# Patient Record
Sex: FEMALE | Race: OTHER | URBAN - METROPOLITAN AREA
[De-identification: names, ages, dates, MRNs, and addresses within clinical notes are randomized per-mention and may not be internally consistent; named-entity substitution may affect disease eponyms.]

---

## 2023-03-23 ENCOUNTER — TELEPHONE (OUTPATIENT)
Dept: OBGYN CLINIC | Facility: CLINIC | Age: 23
End: 2023-03-23

## 2023-03-23 ENCOUNTER — OFFICE VISIT (OUTPATIENT)
Dept: FAMILY MEDICINE CLINIC | Facility: CLINIC | Age: 23
End: 2023-03-23

## 2023-03-23 VITALS
DIASTOLIC BLOOD PRESSURE: 72 MMHG | HEART RATE: 75 BPM | HEIGHT: 61.4 IN | WEIGHT: 186 LBS | SYSTOLIC BLOOD PRESSURE: 106 MMHG | OXYGEN SATURATION: 98 % | BODY MASS INDEX: 34.67 KG/M2

## 2023-03-23 DIAGNOSIS — N91.2 AMENORRHEA: Primary | ICD-10-CM

## 2023-03-23 DIAGNOSIS — Z32.01 PREGNANCY TEST POSITIVE: ICD-10-CM

## 2023-03-23 LAB
CONTROL LINE PRESENT WITH A CLEAR BACKGROUND (YES/NO): YES YES/NO
PREGNANCY TEST, URINE: POSITIVE

## 2023-03-23 PROCEDURE — 81025 URINE PREGNANCY TEST: CPT | Performed by: FAMILY MEDICINE

## 2023-03-23 PROCEDURE — 3078F DIAST BP <80 MM HG: CPT | Performed by: FAMILY MEDICINE

## 2023-03-23 PROCEDURE — 3074F SYST BP LT 130 MM HG: CPT | Performed by: FAMILY MEDICINE

## 2023-03-23 PROCEDURE — 3008F BODY MASS INDEX DOCD: CPT | Performed by: FAMILY MEDICINE

## 2023-03-23 PROCEDURE — 99202 OFFICE O/P NEW SF 15 MIN: CPT | Performed by: FAMILY MEDICINE

## 2023-03-23 NOTE — TELEPHONE ENCOUNTER
Pts LMP of 2/3/2023 making her 6w6d. Pt states regular cycles of every 20-31 days. States +UPT. Pt informed of both male and female providers and the need to rotate PN appt with all since OB on-call will be the one that delivers her. Pt accepts rotation and wishes to proceed with establishing care. Pt instructed to start OTC PNV with DHA, FOLIC ACID AND IRON. Pt scheduled for appt with EMB on 3/30 d/t period cycles. Pt informed once gestation is established we would do OBN at approx 8-9 wks. Pt states understanding.      Stated HT: 5 ft 1 in  Stated Pre-pregnancy WT: 186 lb

## 2023-03-30 ENCOUNTER — OFFICE VISIT (OUTPATIENT)
Dept: OBGYN CLINIC | Facility: CLINIC | Age: 23
End: 2023-03-30

## 2023-03-30 VITALS
WEIGHT: 183.63 LBS | HEART RATE: 76 BPM | DIASTOLIC BLOOD PRESSURE: 79 MMHG | BODY MASS INDEX: 34 KG/M2 | SYSTOLIC BLOOD PRESSURE: 117 MMHG

## 2023-03-30 DIAGNOSIS — N92.6 MISSED MENSES: Primary | ICD-10-CM

## 2023-03-30 DIAGNOSIS — Z32.01 POSITIVE PREGNANCY TEST: ICD-10-CM

## 2023-03-30 PROCEDURE — 3074F SYST BP LT 130 MM HG: CPT | Performed by: NURSE PRACTITIONER

## 2023-03-30 PROCEDURE — 99203 OFFICE O/P NEW LOW 30 MIN: CPT | Performed by: NURSE PRACTITIONER

## 2023-03-30 PROCEDURE — 3078F DIAST BP <80 MM HG: CPT | Performed by: NURSE PRACTITIONER

## 2023-04-01 ENCOUNTER — NURSE ONLY (OUTPATIENT)
Dept: OBGYN CLINIC | Facility: CLINIC | Age: 23
End: 2023-04-01

## 2023-04-01 ENCOUNTER — TELEPHONE (OUTPATIENT)
Dept: OBGYN CLINIC | Facility: CLINIC | Age: 23
End: 2023-04-01

## 2023-04-12 ENCOUNTER — HOSPITAL ENCOUNTER (OUTPATIENT)
Dept: ULTRASOUND IMAGING | Age: 23
Discharge: HOME | End: 2023-04-12
Attending: NURSE PRACTITIONER
Payer: MEDICAID

## 2023-04-12 DIAGNOSIS — N92.6 MISSED MENSES: ICD-10-CM

## 2023-04-12 DIAGNOSIS — Z32.01 POSITIVE PREGNANCY TEST: ICD-10-CM

## 2023-04-12 PROCEDURE — 93975 VASCULAR STUDY: CPT | Performed by: NURSE PRACTITIONER

## 2023-04-12 PROCEDURE — 76817 TRANSVAGINAL US OBSTETRIC: CPT | Performed by: NURSE PRACTITIONER

## 2023-04-17 ENCOUNTER — NURSE ONLY (OUTPATIENT)
Dept: OBGYN CLINIC | Facility: CLINIC | Age: 23
End: 2023-04-17

## 2023-04-17 ENCOUNTER — TELEPHONE (OUTPATIENT)
Dept: OBGYN CLINIC | Facility: CLINIC | Age: 23
End: 2023-04-17

## 2023-04-17 DIAGNOSIS — Z36.9 FIRST TRIMESTER SCREENING: Primary | ICD-10-CM

## 2023-04-17 DIAGNOSIS — Z34.01 ENCOUNTER FOR SUPERVISION OF NORMAL FIRST PREGNANCY IN FIRST TRIMESTER: Primary | ICD-10-CM

## 2023-04-17 NOTE — PROGRESS NOTES
Pt seen for OBN appt today with no complaints dating 11w0d by US. Normal PN labs ordered plus 1 hr gtt d/t BMI. Message sent to referrals for FTS. Pt advised all labs must be completed and resulted prior to MD appt. She is scheduled 4/24/23 with EMB. HT 5'1\"   lb  BMI 34    Partner's name is Claudia Tillman contact #[phone]; race:   Occupation: Customer Care Rep and Dispatcher for [employer] No    Anesthetic complications No    Anxiety/Depression  No    Autoimmune Disorder No    Asthma  No    Cancer No    Diabetes  No    Gyne/breast Surgery No    Heart Disease No    Hepatitis/Liver Disease  No    History of blood transfusion No    History of abnormal pap No    Hypertension  No    Infertility  No    Kidney Disease/Frequent UTIs  No    Medication Allergies No    Latex Allergies No    Food Allergies  No    Neurological Disorder/Epilepsy No    Operations/Hospitalizations No    TB exposure  No    Thyroid Dysfunction No    Trauma/Violence  No    Uterine Anomaly  No    Uterine Fibroids  No    Variocosities/DVTs No    Smoker No    Drug usage in prior year No    Alcohol No    Would you accept a blood transfusion? Yes            INFECTION HISTORY    Chlamydia No    Pt or partner have hx of Genital Herpes No    Gonorrhea No    Hepatitis B No    HIV No    HPV No    MRSA No    Syphilis No    Tattoos Yes    Live with someone or Exposed to TB No    Rash or viral illness since LMP  No    Varicella No vaccinated   Recent Travel (or planned travel) to [facility] area for self and or partner No    Pets Yes 1 cat and 1 dog, reviewed toxoplasmosis     GENETICS SCREENING    Genetic Screening    Genetic Screening/Teratology Counseling- Includes patient, baby's father, or anyone in either family with:  Patient's age 28 years or older as of estimated date of delivery: No   Thalassemia ([place], [place], [address], or  background):  MCV less than 80: No   Neural tube defect (Meningomyelocele, Spina bifida, or Anencephaly): No   Congenital heart defect: Yes (Comment: SELENA's sister with hole in heart, living)   Down syndrome: No   Sumit-Sachs ([place], [place], [place]): No   Canavan disease ([place]): No   Familial dysautonomia ([place]): No   Sickle cell disease or trait (): No   Hemophilia or other blood disorders: No   Muscular dystrophy: No   Cystic fibrosis: No   Whiteside's chorea: No   Intellectual disability and/or autism: Yes (Comment: SELENA's younger siblings, 1 with severe autism)   If yes, was the person tested for Fragile X?: No   Other inherited genetic or chromosomal disorder: No   Maternal metabolic disorder (eg. Type 1 diabetes, PKU): No   Patient or baby's father had child with birth defects not listed above: No   Recurrent pregnancy loss, or a stillbirth: Yes (Comment: Pt's mom with multiple miscarriages)   Medications (including supplements, vitamins, herbs, or OTC drugs)/illicit/recreational drugs/alcohol since last menstrual period: No               MISC    Infant vaccinations  Yes    Pt. Has answered NO 5P questions and has NO  risk factors. Pt. Given What pregnant women need to know handout.

## 2023-04-19 ENCOUNTER — LAB ENCOUNTER (OUTPATIENT)
Dept: LAB | Facility: REFERENCE LAB | Age: 23
End: 2023-04-19
Attending: OBSTETRICS & GYNECOLOGY
Payer: MEDICAID

## 2023-04-19 DIAGNOSIS — Z34.01 ENCOUNTER FOR SUPERVISION OF NORMAL FIRST PREGNANCY IN FIRST TRIMESTER: ICD-10-CM

## 2023-04-19 LAB
ANTIBODY SCREEN: NEGATIVE
BASOPHILS # BLD AUTO: 0.03 X10(3) UL (ref 0–0.2)
BASOPHILS NFR BLD AUTO: 0.5 %
DEPRECATED RDW RBC AUTO: 40.7 FL (ref 35.1–46.3)
EOSINOPHIL # BLD AUTO: 0.09 X10(3) UL (ref 0–0.7)
EOSINOPHIL NFR BLD AUTO: 1.4 %
ERYTHROCYTE [DISTWIDTH] IN BLOOD BY AUTOMATED COUNT: 13.3 % (ref 11–15)
GLUCOSE 1H P GLC SERPL-MCNC: 96 MG/DL
HBV SURFACE AG SER-ACNC: <0.1 [IU]/L
HBV SURFACE AG SERPL QL IA: NONREACTIVE
HCT VFR BLD AUTO: 40.8 %
HCV AB SERPL QL IA: NONREACTIVE
HGB BLD-MCNC: 13.1 G/DL
IMM GRANULOCYTES # BLD AUTO: 0.01 X10(3) UL (ref 0–1)
IMM GRANULOCYTES NFR BLD: 0.2 %
LYMPHOCYTES # BLD AUTO: 1.76 X10(3) UL (ref 1–4)
LYMPHOCYTES NFR BLD AUTO: 27.5 %
MCH RBC QN AUTO: 27 PG (ref 26–34)
MCHC RBC AUTO-ENTMCNC: 32.1 G/DL (ref 31–37)
MCV RBC AUTO: 84.1 FL
MONOCYTES # BLD AUTO: 0.42 X10(3) UL (ref 0.1–1)
MONOCYTES NFR BLD AUTO: 6.6 %
NEUTROPHILS # BLD AUTO: 4.08 X10 (3) UL (ref 1.5–7.7)
NEUTROPHILS # BLD AUTO: 4.08 X10(3) UL (ref 1.5–7.7)
NEUTROPHILS NFR BLD AUTO: 63.8 %
PLATELET # BLD AUTO: 361 10(3)UL (ref 150–450)
RBC # BLD AUTO: 4.85 X10(6)UL
RH BLOOD TYPE: POSITIVE
RUBV IGG SER QL: POSITIVE
RUBV IGG SER-ACNC: 157.3 IU/ML (ref 10–?)
WBC # BLD AUTO: 6.4 X10(3) UL (ref 4–11)

## 2023-04-19 PROCEDURE — 86780 TREPONEMA PALLIDUM: CPT

## 2023-04-19 PROCEDURE — 87389 HIV-1 AG W/HIV-1&-2 AB AG IA: CPT

## 2023-04-19 PROCEDURE — 86803 HEPATITIS C AB TEST: CPT

## 2023-04-19 PROCEDURE — 86850 RBC ANTIBODY SCREEN: CPT

## 2023-04-19 PROCEDURE — 82950 GLUCOSE TEST: CPT

## 2023-04-19 PROCEDURE — 87086 URINE CULTURE/COLONY COUNT: CPT

## 2023-04-19 PROCEDURE — 86787 VARICELLA-ZOSTER ANTIBODY: CPT

## 2023-04-19 PROCEDURE — 87340 HEPATITIS B SURFACE AG IA: CPT

## 2023-04-19 PROCEDURE — 86900 BLOOD TYPING SEROLOGIC ABO: CPT

## 2023-04-19 PROCEDURE — 86762 RUBELLA ANTIBODY: CPT

## 2023-04-19 PROCEDURE — 86901 BLOOD TYPING SEROLOGIC RH(D): CPT

## 2023-04-19 PROCEDURE — 85025 COMPLETE CBC W/AUTO DIFF WBC: CPT

## 2023-04-19 PROCEDURE — 36415 COLL VENOUS BLD VENIPUNCTURE: CPT

## 2023-04-21 LAB
T PALLIDUM AB SER QL: NEGATIVE
VZV IGG SER IA-ACNC: 158.4 (ref 165–?)

## 2023-04-24 ENCOUNTER — INITIAL PRENATAL (OUTPATIENT)
Dept: OBGYN CLINIC | Facility: CLINIC | Age: 23
End: 2023-04-24

## 2023-04-24 ENCOUNTER — TELEPHONE (OUTPATIENT)
Dept: OBGYN CLINIC | Facility: CLINIC | Age: 23
End: 2023-04-24

## 2023-04-24 VITALS
HEART RATE: 71 BPM | DIASTOLIC BLOOD PRESSURE: 72 MMHG | BODY MASS INDEX: 34.1 KG/M2 | SYSTOLIC BLOOD PRESSURE: 109 MMHG | HEIGHT: 61 IN | WEIGHT: 180.63 LBS

## 2023-04-24 DIAGNOSIS — Z12.4 SCREENING FOR CERVICAL CANCER: ICD-10-CM

## 2023-04-24 DIAGNOSIS — O99.210 OBESITY AFFECTING PREGNANCY, ANTEPARTUM: ICD-10-CM

## 2023-04-24 DIAGNOSIS — Z28.39 MATERNAL VARICELLA, NON-IMMUNE: ICD-10-CM

## 2023-04-24 DIAGNOSIS — Z82.79 FAMILY HISTORY OF CONGENITAL HEART DISEASE: ICD-10-CM

## 2023-04-24 DIAGNOSIS — O09.899 MATERNAL VARICELLA, NON-IMMUNE: ICD-10-CM

## 2023-04-24 DIAGNOSIS — Z3A.11 11 WEEKS GESTATION OF PREGNANCY: ICD-10-CM

## 2023-04-24 DIAGNOSIS — Z34.91 ENCOUNTER FOR SUPERVISION OF NORMAL PREGNANCY IN FIRST TRIMESTER, UNSPECIFIED GRAVIDITY: Primary | ICD-10-CM

## 2023-04-24 DIAGNOSIS — Z82.79 FAMILY HISTORY OF CONGENITAL HEART DEFECT: Primary | ICD-10-CM

## 2023-04-24 PROBLEM — Z13.79 GENETIC TESTING: Status: ACTIVE | Noted: 2023-04-24

## 2023-04-24 LAB
APPEARANCE: CLEAR
BILIRUBIN: NEGATIVE
GLUCOSE (URINE DIPSTICK): NEGATIVE MG/DL
KETONES (URINE DIPSTICK): NEGATIVE MG/DL
MULTISTIX LOT#: ABNORMAL NUMERIC
NITRITE, URINE: NEGATIVE
OCCULT BLOOD: NEGATIVE
PH, URINE: 7 (ref 4.5–8)
PROTEIN (URINE DIPSTICK): 30 MG/DL
SPECIFIC GRAVITY: 1.01 (ref 1–1.03)
URINE-COLOR: YELLOW
UROBILINOGEN,SEMI-QN: 0.2 MG/DL (ref 0–1.9)

## 2023-04-24 PROCEDURE — 0500F INITIAL PRENATAL CARE VISIT: CPT | Performed by: NURSE PRACTITIONER

## 2023-04-24 PROCEDURE — 81002 URINALYSIS NONAUTO W/O SCOPE: CPT | Performed by: NURSE PRACTITIONER

## 2023-04-24 PROCEDURE — 3074F SYST BP LT 130 MM HG: CPT | Performed by: NURSE PRACTITIONER

## 2023-04-24 PROCEDURE — 3008F BODY MASS INDEX DOCD: CPT | Performed by: NURSE PRACTITIONER

## 2023-04-24 PROCEDURE — 3078F DIAST BP <80 MM HG: CPT | Performed by: NURSE PRACTITIONER

## 2023-04-24 NOTE — PROGRESS NOTES
New OB. Sure LMP. No nausea or vomiting, no pelvic pain or bleeding. She has sure LMP, US on 4/12 showed SLIUP 10w2d, c/w EDC by LMP. Final EDC 11/10/2023. rev'd new OB labs  Varicella nonimmune  Pap with gc/ct trich done today  Desires gentic testing  Will send to [facility] for level 2 and fetal echo also due to family hx of CHD on FOBs side of family (his sister).   rev'd sab precautions    NORIS Marquez

## 2023-04-24 NOTE — TELEPHONE ENCOUNTER
----- Message from NORIS Felipe sent at 4/24/2023  8:56 AM CDT -----  Regarding: desires genetic testing with MFM, family hx of CHD  Patient desires genetic testing with MFM  She will also need level 2 US and fetal echo due to family hx of congenital heart defect.     NORIS Felipe

## 2023-04-24 NOTE — TELEPHONE ENCOUNTER
FTS order was already placed on 4/17, and pt read on 4/19. Level 2, fetal echo, growth, and NST due to family history of heart defect and high BMI    [facility] sent reminder message sent to schedule FTS and other orders placed.

## 2023-04-25 ENCOUNTER — TELEPHONE (OUTPATIENT)
Dept: OBGYN CLINIC | Facility: CLINIC | Age: 23
End: 2023-04-25

## 2023-04-25 ENCOUNTER — TELEPHONE (OUTPATIENT)
Dept: PERINATAL CARE | Facility: HOSPITAL | Age: 23
End: 2023-04-25

## 2023-04-25 LAB
C TRACH DNA SPEC QL NAA+PROBE: NEGATIVE
N GONORRHOEA DNA SPEC QL NAA+PROBE: NEGATIVE

## 2023-04-25 NOTE — TELEPHONE ENCOUNTER
Spoke to pt. Aware orders have been placed. And provided with MFM number to schedule appt.  Pt verbalized understandings

## 2023-04-25 NOTE — TELEPHONE ENCOUNTER
----- Message from Bueeno sent at 4/25/2023 10:36 AM CDT -----  Regarding: [facility] AUTHORIZATION  Patient is scheduled on 5-4-23  For  CONSULT/FT    [id number]  DX   Family history of congenital heart defect  BMI 34.0-34.9,adult  Needs PA

## 2023-04-26 LAB — T VAGINALIS RRNA SPEC QL NAA+PROBE: NEGATIVE

## 2023-05-04 ENCOUNTER — TELEPHONE (OUTPATIENT)
Dept: PERINATAL CARE | Facility: HOSPITAL | Age: 23
End: 2023-05-04

## 2023-05-04 ENCOUNTER — HOSPITAL ENCOUNTER (OUTPATIENT)
Dept: PERINATAL CARE | Facility: HOSPITAL | Age: 23
Discharge: HOME | End: 2023-05-04
Attending: OBSTETRICS & GYNECOLOGY
Payer: MEDICAID

## 2023-05-04 ENCOUNTER — HOSPITAL ENCOUNTER (OUTPATIENT)
Dept: PERINATAL CARE | Facility: HOSPITAL | Age: 23
Discharge: HOME | End: 2023-05-04
Attending: NURSE PRACTITIONER
Payer: MEDICAID

## 2023-05-04 VITALS
BODY MASS INDEX: 34 KG/M2 | WEIGHT: 180 LBS | SYSTOLIC BLOOD PRESSURE: 114 MMHG | DIASTOLIC BLOOD PRESSURE: 74 MMHG | HEART RATE: 87 BPM

## 2023-05-04 DIAGNOSIS — Z82.79 FAMILY HISTORY OF CONGENITAL HEART DISEASE: ICD-10-CM

## 2023-05-04 DIAGNOSIS — O99.210 OBESITY AFFECTING PREGNANCY, ANTEPARTUM: ICD-10-CM

## 2023-05-04 DIAGNOSIS — Z13.79 GENETIC TESTING: ICD-10-CM

## 2023-05-04 DIAGNOSIS — Z36.9 FIRST TRIMESTER SCREENING: ICD-10-CM

## 2023-05-04 DIAGNOSIS — Z36.9 FIRST TRIMESTER SCREENING: Primary | ICD-10-CM

## 2023-05-04 PROBLEM — E66.9 OBESITY (BMI 30.0-34.9): Status: ACTIVE | Noted: 2023-05-04

## 2023-05-04 PROBLEM — E66.811 OBESITY (BMI 30.0-34.9): Status: ACTIVE | Noted: 2023-05-04

## 2023-05-04 PROCEDURE — 36415 COLL VENOUS BLD VENIPUNCTURE: CPT

## 2023-05-04 PROCEDURE — 76801 OB US < 14 WKS SINGLE FETUS: CPT | Performed by: OBSTETRICS & GYNECOLOGY

## 2023-05-11 ENCOUNTER — TELEPHONE (OUTPATIENT)
Dept: PERINATAL CARE | Facility: HOSPITAL | Age: 23
End: 2023-05-11

## 2023-05-11 NOTE — TELEPHONE ENCOUNTER
Panorama screening results obt  Reviewed by DR Melba Adler    Results:  low risk  Low Risk for Aneuploidies, triploidy and Monosomy X  Fetal Sex: Male  Fetal Fraction: 8.9%      Pt states understanding  Copy of results sent for scanning into pt record

## 2023-05-18 ENCOUNTER — TELEPHONE (OUTPATIENT)
Dept: OBGYN CLINIC | Facility: CLINIC | Age: 23
End: 2023-05-18

## 2023-05-18 LAB — HPV I/H RISK 1 DNA SPEC QL NAA+PROBE: POSITIVE

## 2023-05-18 NOTE — TELEPHONE ENCOUNTER
----- Message from Charlie Church sent at 5/4/2023  9:24 AM CDT -----  Regarding: [facility] AUTHORIZATION  Patient is scheduled on 6-26-23  For  LEVEL 2   [id number]  DX   Family history of congenital heart defect  BMI>34  Needs PA

## 2023-05-18 NOTE — TELEPHONE ENCOUNTER
Your case has been Approved.      Provider Name: DR. Maggy Sahu: nona  Provider Address: [address] [facility] Phone Number: [phone]   Fax Number: [phone]  Patient Name: Dragan Monsalve Patient Id: [id number]  Insurance Carrier: [facility]  Site Name: [address] [place] [address] Site ID: [id number]  Site Address: [address] [place] [address]      Primary Diagnosis Code: O99.210 Description: Obesity complicating pregnancy, unspecified trimester  Secondary Diagnosis Code:  Description:   Date of Service: Not provided    CPT Code: 74940 Description: OB US, DETAILED, [address] [facility] Number: [id number]  Review Date: 5/18/2023 4:55:29 PM  Expiration Date: 9/15/2023  Status: Your case has been Approved

## 2023-05-22 ENCOUNTER — ROUTINE PRENATAL (OUTPATIENT)
Dept: OBGYN CLINIC | Facility: CLINIC | Age: 23
End: 2023-05-22

## 2023-05-22 VITALS
SYSTOLIC BLOOD PRESSURE: 114 MMHG | DIASTOLIC BLOOD PRESSURE: 75 MMHG | WEIGHT: 178 LBS | HEART RATE: 72 BPM | BODY MASS INDEX: 34 KG/M2

## 2023-05-22 DIAGNOSIS — Z34.92 ENCOUNTER FOR SUPERVISION OF NORMAL PREGNANCY IN SECOND TRIMESTER, UNSPECIFIED GRAVIDITY: Primary | ICD-10-CM

## 2023-05-22 PROBLEM — R87.610 ASCUS WITH POSITIVE HIGH RISK HPV CERVICAL: Status: ACTIVE | Noted: 2023-05-22

## 2023-05-22 PROBLEM — R87.810 ASCUS WITH POSITIVE HIGH RISK HPV CERVICAL: Status: ACTIVE | Noted: 2023-05-22

## 2023-05-22 LAB
APPEARANCE: CLEAR
BILIRUBIN: NEGATIVE
GLUCOSE (URINE DIPSTICK): NEGATIVE MG/DL
KETONES (URINE DIPSTICK): NEGATIVE MG/DL
MULTISTIX LOT#: ABNORMAL NUMERIC
NITRITE, URINE: NEGATIVE
OCCULT BLOOD: NEGATIVE
PH, URINE: 7.5 (ref 4.5–8)
PROTEIN (URINE DIPSTICK): NEGATIVE MG/DL
SPECIFIC GRAVITY: 1.01 (ref 1–1.03)
URINE-COLOR: YELLOW
UROBILINOGEN,SEMI-QN: 0.2 MG/DL (ref 0–1.9)

## 2023-05-23 ENCOUNTER — TELEPHONE (OUTPATIENT)
Dept: OBGYN CLINIC | Facility: CLINIC | Age: 23
End: 2023-05-23

## 2023-05-23 NOTE — TELEPHONE ENCOUNTER
Lmtcb.    ----- Message from NORIS Santos sent at 5/18/2023  1:59 PM CDT -----  Pap (age 26) ASCUS, HPV +  Per asccp guidelines, repeat in 1 year    NORIS Santos

## 2023-06-01 ENCOUNTER — PATIENT MESSAGE (OUTPATIENT)
Dept: OBGYN CLINIC | Facility: CLINIC | Age: 23
End: 2023-06-01

## 2023-06-01 ENCOUNTER — TELEPHONE (OUTPATIENT)
Dept: OBGYN CLINIC | Facility: CLINIC | Age: 23
End: 2023-06-01

## 2023-06-01 NOTE — TELEPHONE ENCOUNTER
16w6d pt calling with VIRK since Monday and episode of dizziness this morning at 3am. 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 She confirmed she had not yet eaten at that time. She had family party last night, went to bed at 9pm, so only 5 hrs sleep. She reports consistently achieving 32 oz hydration per day. She does eat a snack every 3 hours or so during the day. For HA she took tylenol 500 mg x 1 dose, but it did not help. Pt directed to increase hydration to 64 oz min. If working early in the AM, needs to eat when she wakes up. She reports she often feels full very easily, so not always hungry. Directed to eat small frequent meals as she has been doing, but needs to wake up if she is out working. Increase sleep. She will nap today and let us know if no improvement in symptoms. Can take tylenol 1000 mg q8hrs prn. Patient verbalized understanding. To SENTHIL for sign off.

## 2023-06-01 NOTE — TELEPHONE ENCOUNTER
Pt called to discuss test results from couple of weeks ago. [employer] message sent on 6/1. Please call.

## 2023-06-21 ENCOUNTER — ROUTINE PRENATAL (OUTPATIENT)
Dept: OBGYN CLINIC | Facility: CLINIC | Age: 23
End: 2023-06-21

## 2023-06-21 VITALS
HEART RATE: 86 BPM | WEIGHT: 176 LBS | SYSTOLIC BLOOD PRESSURE: 102 MMHG | BODY MASS INDEX: 33 KG/M2 | DIASTOLIC BLOOD PRESSURE: 68 MMHG

## 2023-06-21 DIAGNOSIS — Z34.02 ENCOUNTER FOR SUPERVISION OF NORMAL FIRST PREGNANCY IN SECOND TRIMESTER: Primary | ICD-10-CM

## 2023-06-21 LAB
APPEARANCE: CLEAR
GLUCOSE (URINE DIPSTICK): NEGATIVE MG/DL
MULTISTIX LOT#: NORMAL NUMERIC
NITRITE, URINE: NEGATIVE
URINE-COLOR: YELLOW

## 2023-06-21 PROCEDURE — 81002 URINALYSIS NONAUTO W/O SCOPE: CPT | Performed by: OBSTETRICS & GYNECOLOGY

## 2023-06-21 PROCEDURE — 0502F SUBSEQUENT PRENATAL CARE: CPT | Performed by: OBSTETRICS & GYNECOLOGY

## 2023-06-21 PROCEDURE — 3078F DIAST BP <80 MM HG: CPT | Performed by: OBSTETRICS & GYNECOLOGY

## 2023-06-21 PROCEDURE — 3074F SYST BP LT 130 MM HG: CPT | Performed by: OBSTETRICS & GYNECOLOGY
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
## 2023-06-26 ENCOUNTER — APPOINTMENT (OUTPATIENT)
Dept: PERINATAL CARE | Facility: HOSPITAL | Age: 23
End: 2023-06-26
Attending: OBSTETRICS & GYNECOLOGY
Payer: MEDICAID

## 2023-06-26 ENCOUNTER — HOSPITAL ENCOUNTER (OUTPATIENT)
Dept: PERINATAL CARE | Facility: HOSPITAL | Age: 23
Discharge: HOME | End: 2023-06-26
Attending: OBSTETRICS & GYNECOLOGY
Payer: MEDICAID

## 2023-06-26 ENCOUNTER — TELEPHONE (OUTPATIENT)
Dept: PERINATAL CARE | Facility: HOSPITAL | Age: 23
End: 2023-06-26

## 2023-06-26 DIAGNOSIS — O99.210 OBESITY AFFECTING PREGNANCY, ANTEPARTUM: ICD-10-CM

## 2023-06-26 DIAGNOSIS — Z82.79 FAMILY HISTORY OF CONGENITAL HEART DISEASE: Primary | ICD-10-CM

## 2023-06-26 DIAGNOSIS — E66.9 OBESITY (BMI 30.0-34.9): ICD-10-CM

## 2023-06-26 DIAGNOSIS — Z82.79 FAMILY HISTORY OF CONGENITAL HEART DISEASE: ICD-10-CM

## 2023-06-26 NOTE — TELEPHONE ENCOUNTER
CALLED PATIENT AT 8:08 AM. NO ANSWER. LEFT MESSAGE SHE HAS AN 8:00 AM APPOINTMENT TODAY, MONDAY, 6/26/23. WE HAVE A 15 MINUTE LATE POLICY. PLEASE CALL THE OFFICE [phone] AND LET US KNOW IF YOU ARE ON YOUR WAY.

## 2023-07-03 ENCOUNTER — APPOINTMENT (OUTPATIENT)
Dept: ULTRASOUND IMAGING | Facility: HOSPITAL | Age: 23
End: 2023-07-03
Attending: OBSTETRICS & GYNECOLOGY
Payer: MEDICAID

## 2023-07-03 ENCOUNTER — TELEPHONE (OUTPATIENT)
Dept: PERINATAL CARE | Facility: HOSPITAL | Age: 23
End: 2023-07-03

## 2023-07-03 ENCOUNTER — HOSPITAL ENCOUNTER (OUTPATIENT)
Facility: HOSPITAL | Age: 23
Discharge: HOME | End: 2023-07-03
Attending: OBSTETRICS & GYNECOLOGY | Admitting: OBSTETRICS & GYNECOLOGY
Payer: MEDICAID

## 2023-07-03 ENCOUNTER — HOSPITAL ENCOUNTER (EMERGENCY)
Facility: HOSPITAL | Age: 23
Discharge: STILL ADMITTED OR ADMINISTRATIVE | End: 2023-07-03
Payer: MEDICAID

## 2023-07-03 VITALS — DIASTOLIC BLOOD PRESSURE: 65 MMHG | HEART RATE: 99 BPM | SYSTOLIC BLOOD PRESSURE: 108 MMHG

## 2023-07-03 LAB
BASOPHILS # BLD AUTO: 0.02 X10(3) UL (ref 0–0.2)
BASOPHILS NFR BLD AUTO: 0.2 %
DEPRECATED RDW RBC AUTO: 39.8 FL (ref 35.1–46.3)
EOSINOPHIL # BLD AUTO: 0.09 X10(3) UL (ref 0–0.7)
EOSINOPHIL NFR BLD AUTO: 1 %
ERYTHROCYTE [DISTWIDTH] IN BLOOD BY AUTOMATED COUNT: 13.6 % (ref 11–15)
FIBRINOGEN PPP-MCNC: 421 MG/DL (ref 180–480)
HCT VFR BLD AUTO: 32.2 %
HGB BLD-MCNC: 10.4 G/DL
HGB F MFR BLD KLEIH BETKE: <0.1 %
IMM GRANULOCYTES # BLD AUTO: 0.03 X10(3) UL (ref 0–1)
IMM GRANULOCYTES NFR BLD: 0.3 %
LYMPHOCYTES # BLD AUTO: 2.04 X10(3) UL (ref 1–4)
LYMPHOCYTES NFR BLD AUTO: 23.4 %
MCH RBC QN AUTO: 26.2 PG (ref 26–34)
MCHC RBC AUTO-ENTMCNC: 32.3 G/DL (ref 31–37)
MCV RBC AUTO: 81.1 FL
MONOCYTES # BLD AUTO: 0.47 X10(3) UL (ref 0.1–1)
MONOCYTES NFR BLD AUTO: 5.4 %
NEUTROPHILS # BLD AUTO: 6.05 X10 (3) UL (ref 1.5–7.7)
NEUTROPHILS # BLD AUTO: 6.05 X10(3) UL (ref 1.5–7.7)
NEUTROPHILS NFR BLD AUTO: 69.7 %
PLATELET # BLD AUTO: 380 10(3)UL (ref 150–450)
RBC # BLD AUTO: 3.97 X10(6)UL
WBC # BLD AUTO: 8.7 X10(3) UL (ref 4–11)

## 2023-07-03 PROCEDURE — 85025 COMPLETE CBC W/AUTO DIFF WBC: CPT | Performed by: OBSTETRICS & GYNECOLOGY

## 2023-07-03 PROCEDURE — 85384 FIBRINOGEN ACTIVITY: CPT | Performed by: OBSTETRICS & GYNECOLOGY

## 2023-07-03 PROCEDURE — 85460 HEMOGLOBIN FETAL: CPT | Performed by: OBSTETRICS & GYNECOLOGY

## 2023-07-03 PROCEDURE — 76815 OB US LIMITED FETUS(S): CPT | Performed by: OBSTETRICS & GYNECOLOGY

## 2023-07-03 PROCEDURE — 85362 FIBRIN DEGRADATION PRODUCTS: CPT | Performed by: OBSTETRICS & GYNECOLOGY

## 2023-07-03 PROCEDURE — 99214 OFFICE O/P EST MOD 30 MIN: CPT

## 2023-07-03 PROCEDURE — 36415 COLL VENOUS BLD VENIPUNCTURE: CPT

## 2023-07-03 NOTE — PROGRESS NOTES
Pt is a 26year old female admitted to [facility]. Patient presents with:  Vaginal Bleeding: Pt states it was \"a lot\" of bright red bleeding     Pt is  21w3d intra-uterine pregnancy. History obtained, consents signed. Oriented to room, staff, and plan of care.

## 2023-07-03 NOTE — TRIAGE
[facility]      Triage Note    Luis Alfredo Kelley Patient Status:  Outpatient    2000 MRN [id number]   Location [address], [address] [facility] PCP No primary care provider on file.  Para:   Estimated Date of Delivery: 11/10/23  Gestation: 21w3d    Chief Complaint    Vaginal Bleeding         Allergies:  No Known Allergies    Orders Placed This Encounter      CBC With Differential With Platelet      FDP, PLASMA      Fibrinogen Activity      Kleihauer-Betke stain      Kleihauer-Betke Fetal Hemoglobin      Kleihauer-Betke, Path Review      Lab Results   Component Value Date    WBC 8.7 2023    HGB 10.4 (L) 2023    HCT 32.2 (L) 2023    .0 2023       Clinitek UA  Lab Results   Component Value Date    SPECGRAVITY 1.015 2023    URINECUL  2023     <10,000 cfu/ml Mixture of Gram positive organisms isolated - probable contamination. UA  Lab Results   Component Value Date    SPECGRAVITY 1.015 2023    NITRITE Negative 2023  0100   BP: 108/65   Pulse: 99       NST                                                                                                                                                         Additional Comments  US and labs done.  per doppler, no contractions noted. No further bleeding noted per patient but said it was like a period at home and intercourse \"yesterday\". Pt discharged home with instructions to call MFM to make missed appt ASAP and call MD office to be seen today or on Wed. Patient presents with:  Vaginal Bleeding: Pt states it was \"a lot\" of bright red bleeding        Ariel Putnam RN  7/3/2023 3:20 AM           I have reviewed the  chart and contraction monitor and agree with RN interpretation. [place]

## 2023-07-06 ENCOUNTER — OFFICE VISIT (OUTPATIENT)
Dept: FAMILY MEDICINE CLINIC | Facility: CLINIC | Age: 23
End: 2023-07-06

## 2023-07-06 VITALS
DIASTOLIC BLOOD PRESSURE: 73 MMHG | TEMPERATURE: 98 F | HEIGHT: 61 IN | BODY MASS INDEX: 33.99 KG/M2 | HEART RATE: 91 BPM | OXYGEN SATURATION: 98 % | WEIGHT: 180 LBS | SYSTOLIC BLOOD PRESSURE: 107 MMHG

## 2023-07-06 DIAGNOSIS — Z34.92 PREGNANT AND NOT YET DELIVERED IN SECOND TRIMESTER: ICD-10-CM

## 2023-07-06 DIAGNOSIS — N93.9 VAGINAL BLEEDING: Primary | ICD-10-CM

## 2023-07-06 PROCEDURE — 3008F BODY MASS INDEX DOCD: CPT | Performed by: FAMILY MEDICINE

## 2023-07-06 PROCEDURE — 3078F DIAST BP <80 MM HG: CPT | Performed by: FAMILY MEDICINE

## 2023-07-06 PROCEDURE — 3074F SYST BP LT 130 MM HG: CPT | Performed by: FAMILY MEDICINE

## 2023-07-06 PROCEDURE — 99213 OFFICE O/P EST LOW 20 MIN: CPT | Performed by: FAMILY MEDICINE

## 2023-07-18 ENCOUNTER — ROUTINE PRENATAL (OUTPATIENT)
Dept: OBGYN CLINIC | Facility: CLINIC | Age: 23
End: 2023-07-18

## 2023-07-18 VITALS
WEIGHT: 178 LBS | HEART RATE: 76 BPM | DIASTOLIC BLOOD PRESSURE: 75 MMHG | BODY MASS INDEX: 34 KG/M2 | SYSTOLIC BLOOD PRESSURE: 106 MMHG

## 2023-07-18 DIAGNOSIS — Z34.92 ENCOUNTER FOR SUPERVISION OF NORMAL PREGNANCY IN SECOND TRIMESTER, UNSPECIFIED GRAVIDITY: Primary | ICD-10-CM

## 2023-07-18 DIAGNOSIS — O99.012 ANEMIA DURING PREGNANCY IN SECOND TRIMESTER: ICD-10-CM

## 2023-07-18 PROBLEM — E66.811 OBESITY (BMI 30.0-34.9): Status: RESOLVED | Noted: 2023-05-04 | Resolved: 2023-07-18

## 2023-07-18 PROBLEM — E66.9 OBESITY (BMI 30.0-34.9): Status: RESOLVED | Noted: 2023-05-04 | Resolved: 2023-07-18

## 2023-07-18 LAB
BILIRUBIN: NEGATIVE
GLUCOSE (URINE DIPSTICK): NEGATIVE MG/DL
KETONES (URINE DIPSTICK): NEGATIVE MG/DL
LEUKOCYTES: NEGATIVE
MULTISTIX LOT#: 57 NUMERIC
NITRITE, URINE: NEGATIVE
OCCULT BLOOD: NEGATIVE
PH, URINE: 6.5 (ref 4.5–8)
PROTEIN (URINE DIPSTICK): NEGATIVE MG/DL
SPECIFIC GRAVITY: 1.01 (ref 1–1.03)
UROBILINOGEN,SEMI-QN: 0.2 MG/DL (ref 0–1.9)

## 2023-07-18 PROCEDURE — 81002 URINALYSIS NONAUTO W/O SCOPE: CPT | Performed by: OBSTETRICS & GYNECOLOGY

## 2023-07-18 PROCEDURE — 0502F SUBSEQUENT PRENATAL CARE: CPT | Performed by: OBSTETRICS & GYNECOLOGY

## 2023-07-18 PROCEDURE — 3078F DIAST BP <80 MM HG: CPT | Performed by: OBSTETRICS & GYNECOLOGY

## 2023-07-18 PROCEDURE — 3074F SYST BP LT 130 MM HG: CPT | Performed by: OBSTETRICS & GYNECOLOGY

## 2023-07-18 NOTE — PROGRESS NOTES
Missed level 2 ultrasound appt -- tried to reschedule but was told by woman on phone, but told next appt too late for level 2 ultrasound. I called [facility] & told Katlyn Aguilar that pt coming to [facility] office to get an appt for level 2 ultrasound asap. Start iron since Hb only 10. 4.  recheck in 4 wks

## 2023-08-09 ENCOUNTER — HOSPITAL ENCOUNTER (OUTPATIENT)
Dept: PERINATAL CARE | Facility: HOSPITAL | Age: 23
Discharge: HOME | End: 2023-08-09
Attending: OBSTETRICS & GYNECOLOGY
Payer: MEDICAID

## 2023-08-09 VITALS — TEMPERATURE: 98 F

## 2023-08-09 DIAGNOSIS — Z82.79 FAMILY HISTORY OF CONGENITAL HEART DISEASE: Primary | ICD-10-CM

## 2023-08-09 DIAGNOSIS — O99.210 OTHER OBESITY DUE TO EXCESS CALORIES AFFECTING PREGNANCY, ANTEPARTUM: ICD-10-CM

## 2023-08-09 DIAGNOSIS — Z82.79 FAMILY HISTORY OF CONGENITAL HEART DISEASE: ICD-10-CM

## 2023-08-09 DIAGNOSIS — Z36.89 ENCOUNTER FOR FETAL ANATOMIC SURVEY: ICD-10-CM

## 2023-08-09 DIAGNOSIS — E66.09 OTHER OBESITY DUE TO EXCESS CALORIES AFFECTING PREGNANCY, ANTEPARTUM: ICD-10-CM

## 2023-08-09 PROCEDURE — 76811 OB US DETAILED SNGL FETUS: CPT | Performed by: OBSTETRICS & GYNECOLOGY

## 2023-08-09 NOTE — PROGRESS NOTES
Pt for level 2 US  Denies pregnancy complaints  States active fetus    Pt States Sore throat tep 98.0   Pt and SO masked  Enc to get tested for strep /covid

## 2023-08-15 ENCOUNTER — ROUTINE PRENATAL (OUTPATIENT)
Dept: OBGYN CLINIC | Facility: CLINIC | Age: 23
End: 2023-08-15

## 2023-08-15 VITALS
DIASTOLIC BLOOD PRESSURE: 76 MMHG | SYSTOLIC BLOOD PRESSURE: 115 MMHG | WEIGHT: 178.38 LBS | HEART RATE: 87 BPM | BODY MASS INDEX: 34 KG/M2

## 2023-08-15 DIAGNOSIS — Z34.92 ENCOUNTER FOR SUPERVISION OF NORMAL PREGNANCY IN SECOND TRIMESTER, UNSPECIFIED GRAVIDITY: Primary | ICD-10-CM

## 2023-08-15 LAB
APPEARANCE: CLEAR
BILIRUBIN: NEGATIVE
GLUCOSE (URINE DIPSTICK): NEGATIVE MG/DL
KETONES (URINE DIPSTICK): 40 MG/DL
MULTISTIX LOT#: ABNORMAL NUMERIC
NITRITE, URINE: NEGATIVE
OCCULT BLOOD: NEGATIVE
PH, URINE: 6.5 (ref 4.5–8)
SPECIFIC GRAVITY: 1.01 (ref 1–1.03)
UROBILINOGEN,SEMI-QN: 0.2 MG/DL (ref 0–1.9)

## 2023-08-15 PROCEDURE — 3074F SYST BP LT 130 MM HG: CPT | Performed by: OBSTETRICS & GYNECOLOGY

## 2023-08-15 PROCEDURE — 90715 TDAP VACCINE 7 YRS/> IM: CPT | Performed by: OBSTETRICS & GYNECOLOGY

## 2023-08-15 PROCEDURE — 90471 IMMUNIZATION ADMIN: CPT | Performed by: OBSTETRICS & GYNECOLOGY

## 2023-08-15 PROCEDURE — 3078F DIAST BP <80 MM HG: CPT | Performed by: OBSTETRICS & GYNECOLOGY

## 2023-08-15 PROCEDURE — 81002 URINALYSIS NONAUTO W/O SCOPE: CPT | Performed by: OBSTETRICS & GYNECOLOGY

## 2023-08-15 PROCEDURE — 0502F SUBSEQUENT PRENATAL CARE: CPT | Performed by: OBSTETRICS & GYNECOLOGY

## 2023-08-18 ENCOUNTER — LAB ENCOUNTER (OUTPATIENT)
Dept: LAB | Facility: HOSPITAL | Age: 23
End: 2023-08-18
Attending: OBSTETRICS & GYNECOLOGY
Payer: MEDICAID

## 2023-08-18 ENCOUNTER — TELEPHONE (OUTPATIENT)
Dept: OBGYN CLINIC | Facility: CLINIC | Age: 23
End: 2023-08-18

## 2023-08-18 DIAGNOSIS — Z34.92 ENCOUNTER FOR SUPERVISION OF NORMAL PREGNANCY IN SECOND TRIMESTER, UNSPECIFIED GRAVIDITY: Primary | ICD-10-CM

## 2023-08-18 DIAGNOSIS — Z34.92 ENCOUNTER FOR SUPERVISION OF NORMAL PREGNANCY IN SECOND TRIMESTER, UNSPECIFIED GRAVIDITY: ICD-10-CM

## 2023-08-18 LAB — GLUCOSE 1H P GLC SERPL-MCNC: 99 MG/DL

## 2023-08-18 PROCEDURE — 36415 COLL VENOUS BLD VENIPUNCTURE: CPT

## 2023-08-18 PROCEDURE — 82950 GLUCOSE TEST: CPT

## 2023-08-18 NOTE — TELEPHONE ENCOUNTER
Patient scheduled glucose test, no order was in place. Please call patient once order has been sent.

## 2023-09-05 ENCOUNTER — ROUTINE PRENATAL (OUTPATIENT)
Dept: OBGYN CLINIC | Facility: CLINIC | Age: 23
End: 2023-09-05

## 2023-09-05 ENCOUNTER — LAB ENCOUNTER (OUTPATIENT)
Dept: LAB | Facility: HOSPITAL | Age: 23
End: 2023-09-05
Attending: OBSTETRICS & GYNECOLOGY
Payer: MEDICAID

## 2023-09-05 VITALS
WEIGHT: 179.63 LBS | DIASTOLIC BLOOD PRESSURE: 68 MMHG | HEART RATE: 94 BPM | BODY MASS INDEX: 34 KG/M2 | SYSTOLIC BLOOD PRESSURE: 103 MMHG

## 2023-09-05 DIAGNOSIS — Z34.91 ENCOUNTER FOR SUPERVISION OF NORMAL PREGNANCY IN FIRST TRIMESTER, UNSPECIFIED GRAVIDITY: Primary | ICD-10-CM

## 2023-09-05 DIAGNOSIS — O99.012 ANEMIA DURING PREGNANCY IN SECOND TRIMESTER: ICD-10-CM

## 2023-09-05 LAB
DEPRECATED RDW RBC AUTO: 40.4 FL (ref 35.1–46.3)
ERYTHROCYTE [DISTWIDTH] IN BLOOD BY AUTOMATED COUNT: 14.5 % (ref 11–15)
HCT VFR BLD AUTO: 30.2 %
HGB BLD-MCNC: 9.5 G/DL
MCH RBC QN AUTO: 24.1 PG (ref 26–34)
MCHC RBC AUTO-ENTMCNC: 31.5 G/DL (ref 31–37)
MCV RBC AUTO: 76.6 FL
PLATELET # BLD AUTO: 429 10(3)UL (ref 150–450)
RBC # BLD AUTO: 3.94 X10(6)UL
WBC # BLD AUTO: 9.5 X10(3) UL (ref 4–11)

## 2023-09-05 PROCEDURE — 85027 COMPLETE CBC AUTOMATED: CPT

## 2023-09-05 PROCEDURE — 3078F DIAST BP <80 MM HG: CPT | Performed by: OBSTETRICS & GYNECOLOGY

## 2023-09-05 PROCEDURE — 0502F SUBSEQUENT PRENATAL CARE: CPT | Performed by: OBSTETRICS & GYNECOLOGY

## 2023-09-05 PROCEDURE — 36415 COLL VENOUS BLD VENIPUNCTURE: CPT

## 2023-09-05 PROCEDURE — 3074F SYST BP LT 130 MM HG: CPT | Performed by: OBSTETRICS & GYNECOLOGY

## 2023-09-05 NOTE — PROGRESS NOTES
Normal 1 hr glucola. Lab did not run CBC. She will go after visit and draw CBC and ferritin. Taking PNV and iron. Has not made growth ultrasound with MFM yet.   RTC 2 wk

## 2023-09-07 ENCOUNTER — TELEPHONE (OUTPATIENT)
Dept: OBGYN CLINIC | Facility: CLINIC | Age: 23
End: 2023-09-07

## 2023-09-07 DIAGNOSIS — O99.019 ANEMIA AFFECTING PREGNANCY, ANTEPARTUM: ICD-10-CM

## 2023-09-07 NOTE — TELEPHONE ENCOUNTER
Pt informed of results and recs for iron BID and getting ferritin level drawn. Pt states she asked the  if both labs would be run and they told her yet. I apologized that pt needs to come back for another blood draw. Pt states she was alreadt told to take iron BID but she isn't doing it. Pt informed of the importance especially with her hgb dropping from the last draw. Pt advised to take colace 50mg 1-2 daily, and to increase fiber, fluids and activity. Pt also advised she can take metamucil of needed. Pt states SYDNI told her she needs a growth US at 32 weeks. Pt asking if this will be the last US. Pt informed it should be as long as it is normal.  No order is in for the [address]. Message to Dio Tran to have order placed.

## 2023-09-07 NOTE — TELEPHONE ENCOUNTER
Dione Cuba with MFM calling. Pt scheduled Growth US for 9/19/23. Pt asking about Fetal Echo. Pt was advised to do Fetal Echo between 24-26 weeks due to family history of congenital heart defect. Per MFM- pt missed window for Fetal Echo. They will proceed with 32 week growth and BPP. To SYDNI on call with ADRIANA.

## 2023-09-07 NOTE — TELEPHONE ENCOUNTER
30w6d  Per JLK's notes from visit on 9/5, pt was to have CBC and ferritin levels drawn. Only CBC was done. Spoke with Sadia Gutiérrez in the reference lab and she states ferritin cannot be run from that sample. Pt would need to return to the lab. Per visit notes, pt is taking her pnv and iron. Message to Rajinder Ruffin [id number] to review CBC and see if she wants pt to return for ferritin level.     Component      Latest Ref Rng 7/3/2023 9/5/2023   Hemoglobin      12.0 - 16.0 g/dL 10.4 (L)  9.5 (L)    Hematocrit      35.0 - 48.0 % 32.2 (L)  30.2 (L)       Legend:  (L) Low

## 2023-09-11 ENCOUNTER — TELEPHONE (OUTPATIENT)
Dept: OBGYN CLINIC | Facility: CLINIC | Age: 23
End: 2023-09-11

## 2023-09-11 ENCOUNTER — LAB ENCOUNTER (OUTPATIENT)
Dept: LAB | Facility: HOSPITAL | Age: 23
End: 2023-09-11
Attending: OBSTETRICS & GYNECOLOGY
Payer: MEDICAID

## 2023-09-11 DIAGNOSIS — O99.019 ANTEPARTUM ANEMIA: Primary | ICD-10-CM

## 2023-09-11 DIAGNOSIS — O99.019 ANTEPARTUM ANEMIA: ICD-10-CM

## 2023-09-11 LAB — DEPRECATED HBV CORE AB SER IA-ACNC: 6.9 NG/ML

## 2023-09-11 PROCEDURE — 36415 COLL VENOUS BLD VENIPUNCTURE: CPT

## 2023-09-11 PROCEDURE — 82728 ASSAY OF FERRITIN: CPT

## 2023-09-11 NOTE — TELEPHONE ENCOUNTER
Informed pt of [facility] recs. Pt states she is taking pnv and extra iron. Pt states she will have ferritin level drawn today and will increase her iron supplement to BID. Order placed for repeat ferritin and CBC in 4 wks. Recommended 64 oz water daily and advised Colace can help with potential constipation from taking iron supplements. Pt verbalized understanding.    ----- Message from Celia Porter MD sent at 9/8/2023 10:31 AM CDT -----  Pt's anemia got worse. Check ferritin now. Is she not taking her PNV & extra iron?  If has been compliant, increase iron to twice a day & check CBC & ferritin in 4 wks

## 2023-09-14 ENCOUNTER — HOSPITAL ENCOUNTER (EMERGENCY)
Facility: HOSPITAL | Age: 23
Discharge: LEFT BEFORE DISCHARGE | End: 2023-09-14
Payer: MEDICAID

## 2023-09-14 VITALS
DIASTOLIC BLOOD PRESSURE: 74 MMHG | SYSTOLIC BLOOD PRESSURE: 114 MMHG | TEMPERATURE: 98 F | RESPIRATION RATE: 19 BRPM | WEIGHT: 179 LBS | OXYGEN SATURATION: 100 % | HEIGHT: 62 IN | BODY MASS INDEX: 32.94 KG/M2 | HEART RATE: 88 BPM

## 2023-09-19 ENCOUNTER — HOSPITAL ENCOUNTER (OUTPATIENT)
Dept: PERINATAL CARE | Facility: HOSPITAL | Age: 23
Discharge: HOME | End: 2023-09-19
Attending: OBSTETRICS & GYNECOLOGY
Payer: MEDICAID

## 2023-09-19 ENCOUNTER — ROUTINE PRENATAL (OUTPATIENT)
Dept: OBGYN CLINIC | Facility: CLINIC | Age: 23
End: 2023-09-19

## 2023-09-19 VITALS — BODY MASS INDEX: 33 KG/M2 | WEIGHT: 179 LBS

## 2023-09-19 VITALS
SYSTOLIC BLOOD PRESSURE: 103 MMHG | BODY MASS INDEX: 33 KG/M2 | HEART RATE: 77 BPM | WEIGHT: 182 LBS | DIASTOLIC BLOOD PRESSURE: 71 MMHG

## 2023-09-19 DIAGNOSIS — Z82.79 FAMILY HISTORY OF CONGENITAL HEART DISEASE: Primary | ICD-10-CM

## 2023-09-19 DIAGNOSIS — Z82.79 FAMILY HISTORY OF CONGENITAL HEART DISEASE: ICD-10-CM

## 2023-09-19 DIAGNOSIS — O99.210 OTHER OBESITY DUE TO EXCESS CALORIES AFFECTING PREGNANCY, ANTEPARTUM: ICD-10-CM

## 2023-09-19 DIAGNOSIS — E66.09 OTHER OBESITY DUE TO EXCESS CALORIES AFFECTING PREGNANCY, ANTEPARTUM: ICD-10-CM

## 2023-09-19 DIAGNOSIS — Z34.93 ENCOUNTER FOR SUPERVISION OF NORMAL PREGNANCY IN THIRD TRIMESTER, UNSPECIFIED GRAVIDITY: Primary | ICD-10-CM

## 2023-09-19 LAB
BILIRUBIN: NEGATIVE
GLUCOSE (URINE DIPSTICK): NEGATIVE MG/DL
KETONES (URINE DIPSTICK): NEGATIVE MG/DL
LEUKOCYTES: NEGATIVE
MULTISTIX LOT#: 57 NUMERIC
NITRITE, URINE: NEGATIVE
OCCULT BLOOD: NEGATIVE
PH, URINE: 6 (ref 4.5–8)
SPECIFIC GRAVITY: 1.01 (ref 1–1.03)
UROBILINOGEN,SEMI-QN: 0.2 MG/DL (ref 0–1.9)

## 2023-09-19 PROCEDURE — 3074F SYST BP LT 130 MM HG: CPT | Performed by: OBSTETRICS & GYNECOLOGY

## 2023-09-19 PROCEDURE — 76819 FETAL BIOPHYS PROFIL W/O NST: CPT

## 2023-09-19 PROCEDURE — 76816 OB US FOLLOW-UP PER FETUS: CPT | Performed by: OBSTETRICS & GYNECOLOGY

## 2023-09-19 PROCEDURE — 81002 URINALYSIS NONAUTO W/O SCOPE: CPT | Performed by: OBSTETRICS & GYNECOLOGY

## 2023-09-19 PROCEDURE — 3078F DIAST BP <80 MM HG: CPT | Performed by: OBSTETRICS & GYNECOLOGY

## 2023-09-19 PROCEDURE — 0502F SUBSEQUENT PRENATAL CARE: CPT | Performed by: OBSTETRICS & GYNECOLOGY

## 2023-09-20 ENCOUNTER — TELEPHONE (OUTPATIENT)
Dept: OBGYN CLINIC | Facility: CLINIC | Age: 23
End: 2023-09-20

## 2023-09-20 NOTE — TELEPHONE ENCOUNTER
Order received from [facility] for written order for breastpump. Fax sent back and cover sheet sent to scanning. Subjective:   Patient ID:  Suzie Dawkins is a 83 y.o. female who presents for follow-up of No chief complaint on file.  The patient location is: home  The chief complaint leading to consultation is:   Visit type: Virtual visit with synchronous audio   Total time spent with patient: 10 minutes including chart review  Each patient to whom he or she provides medical services by telemedicine is:  (1) informed of the relationship between the physician and patient and the respective role of any other health care provider with respect to management of the patient; and (2) notified that he or she may decline to receive medical services by telemedicine and may withdraw from such care at any time.     Pt with ER visit ( [facility]) presyncope , ER states dehydration  Pt with ? Rash with chlorthalidone, started  on HCTZ 1 dose  DX with UTI ( recurrent) started antibiotic    Hypertension  This is a chronic problem. The current episode started more than 1 year ago. The problem has been gradually improving since onset. The problem is controlled. Pertinent negatives include no chest pain, palpitations or shortness of breath. Past treatments include beta blockers and angiotensin blockers. The current treatment provides moderate improvement. There are no compliance problems.    Hyperlipidemia  This is a chronic problem. The current episode started more than 1 year ago. The problem is controlled. Pertinent negatives include no chest pain or shortness of breath. Current antihyperlipidemic treatment includes statins. The current treatment provides moderate improvement of lipids. There are no compliance problems.  Risk factors for coronary artery disease include hypertension and dyslipidemia.     Review of Systems   Constitutional: Negative. Negative for weight gain.   HENT: Negative.     Eyes: Negative.    Cardiovascular: Negative.  Negative for chest pain, leg swelling and palpitations.   Respiratory: Negative.  Negative for shortness of  breath.    Endocrine: Negative.    Hematologic/Lymphatic: Negative.    Skin: Negative.    Musculoskeletal:  Negative for muscle weakness.   Gastrointestinal: Negative.    Genitourinary: Negative.    Neurological: Negative.  Negative for dizziness.   Psychiatric/Behavioral: Negative.     Allergic/Immunologic: Negative.    All other systems reviewed and are negative.  Family History   Problem Relation Age of Onset    Cancer Father         lung    Arthritis Father     Cancer Maternal Aunt         breast    Arthritis Maternal Aunt     Breast cancer Maternal Aunt     Cancer Maternal Uncle     Arthritis Paternal Aunt     Cancer Maternal Grandfather         glands cancer     Arthritis Paternal Aunt     Arthritis Maternal Aunt      Past Medical History:   Diagnosis Date    Anxiety     Arthritis     Cancer 2007    carcinoid of the terminal ileum    Depression     GERD (gastroesophageal reflux disease)     Hypercalcemia     Hyperlipidemia     Hypertension     Osteoporosis 10/7/2013    Vitamin D deficiency      Social History     Socioeconomic History    Marital status: Single   Tobacco Use    Smoking status: Former     Types: Cigarettes     Quit date: 1989     Years since quittin.3    Smokeless tobacco: Never   Substance and Sexual Activity    Alcohol use: No    Drug use: No    Sexual activity: Never     Current Outpatient Medications on File Prior to Visit   Medication Sig Dispense Refill    alendronate (FOSAMAX) 70 MG tablet TAKE 1 TABLET ONCE A WEEK ON THURSDAY 4 tablet 4    ascorbic acid, vitamin C, (VITAMIN C) 500 MG tablet Take 500 mg by mouth once daily.      aspirin (ECOTRIN) 81 MG EC tablet Take 1 tablet (81 mg total) by mouth once daily. 90 tablet 3    atorvastatin (LIPITOR) 40 MG tablet TAKE 1 TABLET EVERY EVENING 90 tablet 2    cholestyramine-aspartame (CHOLESTYRAMINE LIGHT) 4 gram PwPk Take 1 packet (4 g total) by mouth daily as needed. 42 packet 3    citalopram (CELEXA) 20 MG tablet Take 1.5 tablets (30  mg total) by mouth once daily. 135 tablet 4    cranberry fruit extract (CRANBERRY ORAL) Take 1 tablet by mouth once daily.      diphenoxylate-atropine 2.5-0.025 mg (LOMOTIL) 2.5-0.025 mg per tablet Take 1 tablet by mouth 4 (four) times daily as needed for Diarrhea. 90 tablet 5    ELDERBERRY FRUIT ORAL Take 1 tablet by mouth daily as needed.      estradioL (ESTRACE) 0.01 % (0.1 mg/gram) vaginal cream SMARTSIG:Sparingly Vaginal Every Night      estradioL (ESTRACE) 0.01 % (0.1 mg/gram) vaginal cream Place 1 g vaginally 3 (three) times a week. 42.5 g 3    fluticasone propionate (FLONASE) 50 mcg/actuation nasal spray 1 spray by Each Nostril route daily as needed for Rhinitis.      hydroCHLOROthiazide (HYDRODIURIL) 12.5 MG Tab Take 1 tablet (12.5 mg total) by mouth once daily. 30 tablet 11    hydrOXYzine HCL (ATARAX) 10 MG Tab TAKE 1 TABLET TWICE DAILY AS NEEDED FOR ANXIETY *THANK YOU* 30 tablet 0    ipratropium (ATROVENT) 21 mcg (0.03 %) nasal spray 2 sprays by Nasal route 2 (two) times daily as needed for Rhinitis. 30 mL 5    L.acidophil,parac-S.therm-Bif. (RISAQUAD) Cap capsule Take 1 capsule by mouth once daily.      losartan (COZAAR) 100 MG tablet Take 1 tablet (100 mg total) by mouth once daily. 90 tablet 4    meclizine (ANTIVERT) 25 mg tablet Take 1 tablet (25 mg total) by mouth 3 (three) times daily as needed for Dizziness or Nausea. 30 tablet 12    metoprolol succinate (TOPROL-XL) 50 MG 24 hr tablet Take 1 tablet (50 mg total) by mouth once daily. 30 tablet 11    mirabegron (MYRBETRIQ) 25 mg Tb24 ER tablet Take 1 tablet (25 mg total) by mouth once daily. 30 tablet 11    MULTIVIT,CALC,MINS/IRON/FOLIC (ONE-A-DAY WOMENS FORMULA ORAL) Take 1 tablet by mouth once daily.      nitrofurantoin, macrocrystal-monohydrate, (MACROBID) 100 MG capsule Take 1 capsule (100 mg total) by mouth 2 (two) times daily. 20 capsule 0    ondansetron (ZOFRAN-ODT) 4 MG TbDL DISSOLVE ONE TABLET BY MOUTH EVERY 6 HOURS AS NEEDED FOR NAUSEA 60  tablet 1    potassium chloride SA (K-DUR,KLOR-CON) 20 MEQ tablet Take 1 tablet (20 mEq total) by mouth 2 (two) times daily. 60 tablet 11    promethazine (PHENERGAN) 25 MG tablet Take 1 tablet (25 mg total) by mouth every 6 (six) hours as needed for Nausea. 30 tablet 0    scopolamine (TRANSDERM-SCOP) 1.3-1.5 mg (1 mg over 3 days) Place 1 patch onto the skin every 72 hours. Strength: 1 mg over 3 days 4 patch 12    temazepam (RESTORIL) 15 mg Cap TAKE ONE CAPSULE NIGHTLY AT BEDTIME 28 capsule 0    temazepam (RESTORIL) 30 mg capsule Take 1 capsule (30 mg total) by mouth nightly. 90 capsule 1    topiramate (TOPAMAX) 25 MG tablet Take 1 tablet (25 mg total) by mouth once daily. 90 tablet 4    vitamin D 1000 units Tab Take 1,000 Units by mouth once daily.       No current facility-administered medications on file prior to visit.     Review of patient's allergies indicates:   Allergen Reactions    Latex Hives    B12 [cyanocobalamin-cobamamide] Other (See Comments)     Caused flushing and caused skin to peel    Ciprofloxacin Diarrhea    Codeine      Other reaction(s): Muscle pain    Iodine      Other reaction(s): Hives    Meperidine      Other reaction(s): Muscle pain    Narcotic antagonist      Other reaction(s): Rash    Nitrofuran analogues     Penicillins Nausea And Vomiting    Propoxyphene      Other reaction(s): Muscle pain    Sulfa (sulfonamide antibiotics)      Other reaction(s): Muscle pain  Other reaction(s): Hives       Objective:     Physical Exam    Assessment:   HTN  HLP  CKD      Plan:     Stop diuretic  Continue metoprolol, losartan-HTN  Continue antibiotics  BP diary

## 2023-10-10 ENCOUNTER — HOSPITAL ENCOUNTER (OUTPATIENT)
Dept: PERINATAL CARE | Facility: HOSPITAL | Age: 23
Discharge: HOME | End: 2023-10-10
Attending: OBSTETRICS & GYNECOLOGY
Payer: MEDICAID

## 2023-10-10 DIAGNOSIS — O99.210 OBESITY AFFECTING PREGNANCY, ANTEPARTUM: Primary | ICD-10-CM

## 2023-10-10 PROCEDURE — 59025 FETAL NON-STRESS TEST: CPT | Performed by: OBSTETRICS & GYNECOLOGY

## 2023-10-10 PROCEDURE — 59025 FETAL NON-STRESS TEST: CPT

## 2023-10-15 ENCOUNTER — NST DOCUMENTATION (OUTPATIENT)
Dept: OBGYN CLINIC | Facility: CLINIC | Age: 23
End: 2023-10-15

## 2023-10-16 NOTE — NST
Nonstress Test   Patient: Chantell Polanco    Diagnosis from order:   Obesity in pregnancy       NST:        10/10/2023   NST DOCUMENTATION   Variability 6-25 BPM   Accelerations Yes   Baseline 135 BPM   Uterine Irritability No   Contractions Not present   Acoustic Stimulator No   Nonstress Test Interpretation Reactive   Nonstress Test Second Interpretation Reactive   NST Completed by Wanda SHEN   Disposition Home    Testing Plan Weekly NST   Provider Notified Asa Landers DO         I agree with the above evaluation. NST completed.   Flavia Smith DO  10/15/2023  9:53 PM

## 2023-10-24 ENCOUNTER — HOSPITAL ENCOUNTER (OUTPATIENT)
Dept: PERINATAL CARE | Facility: HOSPITAL | Age: 23
Discharge: HOME | End: 2023-10-24
Attending: OBSTETRICS & GYNECOLOGY

## 2023-10-24 VITALS — SYSTOLIC BLOOD PRESSURE: 106 MMHG | DIASTOLIC BLOOD PRESSURE: 68 MMHG

## 2023-10-24 DIAGNOSIS — O99.210 OTHER OBESITY DUE TO EXCESS CALORIES AFFECTING PREGNANCY, ANTEPARTUM: Primary | ICD-10-CM

## 2023-10-24 DIAGNOSIS — E66.09 OTHER OBESITY DUE TO EXCESS CALORIES AFFECTING PREGNANCY, ANTEPARTUM: Primary | ICD-10-CM

## 2023-10-24 PROCEDURE — 59025 FETAL NON-STRESS TEST: CPT | Performed by: OBSTETRICS & GYNECOLOGY

## 2023-10-24 PROCEDURE — 59025 FETAL NON-STRESS TEST: CPT

## 2023-10-25 ENCOUNTER — LAB ENCOUNTER (OUTPATIENT)
Dept: LAB | Facility: HOSPITAL | Age: 23
End: 2023-10-25
Attending: OBSTETRICS & GYNECOLOGY

## 2023-10-25 ENCOUNTER — ROUTINE PRENATAL (OUTPATIENT)
Dept: OBGYN CLINIC | Facility: CLINIC | Age: 23
End: 2023-10-25

## 2023-10-25 VITALS
HEART RATE: 89 BPM | DIASTOLIC BLOOD PRESSURE: 78 MMHG | SYSTOLIC BLOOD PRESSURE: 112 MMHG | WEIGHT: 186 LBS | BODY MASS INDEX: 34 KG/M2

## 2023-10-25 DIAGNOSIS — Z34.03 ENCOUNTER FOR SUPERVISION OF NORMAL FIRST PREGNANCY IN THIRD TRIMESTER: ICD-10-CM

## 2023-10-25 DIAGNOSIS — Z34.03 ENCOUNTER FOR SUPERVISION OF NORMAL FIRST PREGNANCY IN THIRD TRIMESTER: Primary | ICD-10-CM

## 2023-10-25 DIAGNOSIS — O99.019 ANTEPARTUM ANEMIA: ICD-10-CM

## 2023-10-25 DIAGNOSIS — O99.012 ANEMIA DURING PREGNANCY IN SECOND TRIMESTER: ICD-10-CM

## 2023-10-25 PROBLEM — Z91.199 NONCOMPLIANCE: Status: ACTIVE | Noted: 2023-10-25

## 2023-10-25 LAB
APPEARANCE: CLEAR
BASOPHILS # BLD AUTO: 0.03 X10(3) UL (ref 0–0.2)
BASOPHILS NFR BLD AUTO: 0.4 %
DEPRECATED HBV CORE AB SER IA-ACNC: 9.4 NG/ML
DEPRECATED RDW RBC AUTO: 43.8 FL (ref 35.1–46.3)
EOSINOPHIL # BLD AUTO: 0.06 X10(3) UL (ref 0–0.7)
EOSINOPHIL NFR BLD AUTO: 0.7 %
ERYTHROCYTE [DISTWIDTH] IN BLOOD BY AUTOMATED COUNT: 16.8 % (ref 11–15)
GLUCOSE (URINE DIPSTICK): NEGATIVE MG/DL
HCT VFR BLD AUTO: 29.5 %
HGB BLD-MCNC: 9 G/DL
IMM GRANULOCYTES # BLD AUTO: 0.05 X10(3) UL (ref 0–1)
IMM GRANULOCYTES NFR BLD: 0.6 %
KETONES (URINE DIPSTICK): 15 MG/DL
LYMPHOCYTES # BLD AUTO: 1.94 X10(3) UL (ref 1–4)
LYMPHOCYTES NFR BLD AUTO: 23.6 %
MCH RBC QN AUTO: 22.2 PG (ref 26–34)
MCHC RBC AUTO-ENTMCNC: 30.5 G/DL (ref 31–37)
MCV RBC AUTO: 72.8 FL
MONOCYTES # BLD AUTO: 0.46 X10(3) UL (ref 0.1–1)
MONOCYTES NFR BLD AUTO: 5.6 %
MULTISTIX LOT#: ABNORMAL NUMERIC
NEUTROPHILS # BLD AUTO: 5.67 X10 (3) UL (ref 1.5–7.7)
NEUTROPHILS # BLD AUTO: 5.67 X10(3) UL (ref 1.5–7.7)
NEUTROPHILS NFR BLD AUTO: 69.1 %
NITRITE, URINE: NEGATIVE
PLATELET # BLD AUTO: 488 10(3)UL (ref 150–450)
RBC # BLD AUTO: 4.05 X10(6)UL
URINE-COLOR: YELLOW
WBC # BLD AUTO: 8.2 X10(3) UL (ref 4–11)

## 2023-10-25 PROCEDURE — 85025 COMPLETE CBC W/AUTO DIFF WBC: CPT

## 2023-10-25 PROCEDURE — 99213 OFFICE O/P EST LOW 20 MIN: CPT | Performed by: OBSTETRICS & GYNECOLOGY

## 2023-10-25 PROCEDURE — 3074F SYST BP LT 130 MM HG: CPT | Performed by: OBSTETRICS & GYNECOLOGY

## 2023-10-25 PROCEDURE — 86780 TREPONEMA PALLIDUM: CPT

## 2023-10-25 PROCEDURE — 3078F DIAST BP <80 MM HG: CPT | Performed by: OBSTETRICS & GYNECOLOGY

## 2023-10-25 PROCEDURE — 82728 ASSAY OF FERRITIN: CPT

## 2023-10-25 PROCEDURE — 81002 URINALYSIS NONAUTO W/O SCOPE: CPT | Performed by: OBSTETRICS & GYNECOLOGY

## 2023-10-25 PROCEDURE — 36415 COLL VENOUS BLD VENIPUNCTURE: CPT

## 2023-10-25 PROCEDURE — 87389 HIV-1 AG W/HIV-1&-2 AB AG IA: CPT

## 2023-10-26 ENCOUNTER — TELEPHONE (OUTPATIENT)
Dept: OBGYN CLINIC | Facility: CLINIC | Age: 23
End: 2023-10-26

## 2023-10-26 NOTE — TELEPHONE ENCOUNTER
Pt asking to review numbers from labs. Advised HGB 9/5 was 9.5, yesterday is was 9.0, so decreasing. Pt appreciates additional info. She will increase iron.

## 2023-10-26 NOTE — TELEPHONE ENCOUNTER
Patient notified of results and recs. Pt states she has constipation with iron, so has been inconsistent about taking daily, sometimes every other day. Advised to increase to iron BID with Colace 50 mg 1-2 x daily plus good hydration. Patient verbalized understanding.

## 2023-10-27 ENCOUNTER — NST DOCUMENTATION (OUTPATIENT)
Dept: OBGYN CLINIC | Facility: CLINIC | Age: 23
End: 2023-10-27

## 2023-10-27 LAB
GROUP B STREP BY PCR FOR PCR OVT: NEGATIVE
T PALLIDUM AB SER QL: NEGATIVE

## 2023-10-27 NOTE — NST
Nonstress Test   Patient: Keira Samson    Gestation: 38w0d    Diagnosis from order:   Obesity       NST:        10/24/2023   NST DOCUMENTATION   Variability 6-25 BPM   Accelerations Yes   Decelerations None   Baseline 125 BPM   Uterine Irritability No   Contractions Not present   Acoustic Stimulator No   Nonstress Test Interpretation Reactive   FHR Category Category I   Comments +FM   NST Completed by Whitney Ganser RN   Disposition home    Testing Plan Weekly NST   Provider Notified Maikel Iverson         I agree with the above evaluation. NST completed.   Denisse Hinkle DO  10/27/2023  7:42 AM

## 2023-10-31 ENCOUNTER — HOSPITAL ENCOUNTER (OUTPATIENT)
Dept: PERINATAL CARE | Facility: HOSPITAL | Age: 23
Discharge: HOME | End: 2023-10-31
Attending: OBSTETRICS & GYNECOLOGY

## 2023-10-31 DIAGNOSIS — O99.210 OBESITY AFFECTING PREGNANCY, ANTEPARTUM: Primary | ICD-10-CM

## 2023-10-31 PROCEDURE — 59025 FETAL NON-STRESS TEST: CPT

## 2023-10-31 PROCEDURE — 59025 FETAL NON-STRESS TEST: CPT | Performed by: OBSTETRICS & GYNECOLOGY

## 2023-11-01 ENCOUNTER — ROUTINE PRENATAL (OUTPATIENT)
Dept: OBGYN CLINIC | Facility: CLINIC | Age: 23
End: 2023-11-01

## 2023-11-01 VITALS
BODY MASS INDEX: 34 KG/M2 | HEART RATE: 105 BPM | SYSTOLIC BLOOD PRESSURE: 118 MMHG | DIASTOLIC BLOOD PRESSURE: 83 MMHG | WEIGHT: 188.38 LBS

## 2023-11-01 DIAGNOSIS — Z34.03 ENCOUNTER FOR SUPERVISION OF NORMAL FIRST PREGNANCY IN THIRD TRIMESTER: Primary | ICD-10-CM

## 2023-11-01 LAB
APPEARANCE: CLEAR
BILIRUBIN: NEGATIVE
GLUCOSE (URINE DIPSTICK): NEGATIVE MG/DL
KETONES (URINE DIPSTICK): 80 MG/DL
MULTISTIX LOT#: ABNORMAL NUMERIC
NITRITE, URINE: NEGATIVE
OCCULT BLOOD: NEGATIVE
PH, URINE: 6 (ref 4.5–8)
PROTEIN (URINE DIPSTICK): 30 MG/DL
SPECIFIC GRAVITY: 1.01 (ref 1–1.03)
URINE-COLOR: YELLOW
UROBILINOGEN,SEMI-QN: 0.2 MG/DL (ref 0–1.9)

## 2023-11-01 PROCEDURE — 3079F DIAST BP 80-89 MM HG: CPT | Performed by: OBSTETRICS & GYNECOLOGY

## 2023-11-01 PROCEDURE — 3074F SYST BP LT 130 MM HG: CPT | Performed by: OBSTETRICS & GYNECOLOGY

## 2023-11-01 PROCEDURE — 99213 OFFICE O/P EST LOW 20 MIN: CPT | Performed by: OBSTETRICS & GYNECOLOGY

## 2023-11-01 PROCEDURE — 81002 URINALYSIS NONAUTO W/O SCOPE: CPT | Performed by: OBSTETRICS & GYNECOLOGY

## 2023-11-01 RX ORDER — DOXYCYCLINE HYCLATE 50 MG/1
325 CAPSULE, GELATIN COATED ORAL
Status: ON HOLD | COMMUNITY

## 2023-11-05 ENCOUNTER — TELEPHONE (OUTPATIENT)
Dept: OBGYN CLINIC | Facility: CLINIC | Age: 23
End: 2023-11-05

## 2023-11-05 ENCOUNTER — HOSPITAL ENCOUNTER (INPATIENT)
Facility: HOSPITAL | Age: 23
LOS: 2 days | Discharge: HOME | End: 2023-11-07
Attending: OBSTETRICS & GYNECOLOGY | Admitting: OBSTETRICS & GYNECOLOGY
Payer: MEDICAID

## 2023-11-05 PROBLEM — Z37.9 NORMAL LABOR: Status: ACTIVE | Noted: 2023-11-05

## 2023-11-05 PROBLEM — Z37.9 NORMAL LABOR (HCC): Status: ACTIVE | Noted: 2023-11-05

## 2023-11-05 LAB
ANTIBODY SCREEN: NEGATIVE
BASOPHILS # BLD AUTO: 0.04 X10(3) UL (ref 0–0.2)
BASOPHILS NFR BLD AUTO: 0.3 %
DEPRECATED RDW RBC AUTO: 44.5 FL (ref 35.1–46.3)
EOSINOPHIL # BLD AUTO: 0.01 X10(3) UL (ref 0–0.7)
EOSINOPHIL NFR BLD AUTO: 0.1 %
ERYTHROCYTE [DISTWIDTH] IN BLOOD BY AUTOMATED COUNT: 17.8 % (ref 11–15)
HCT VFR BLD AUTO: 32.7 %
HGB BLD-MCNC: 9.8 G/DL
IMM GRANULOCYTES # BLD AUTO: 0.08 X10(3) UL (ref 0–1)
IMM GRANULOCYTES NFR BLD: 0.7 %
LYMPHOCYTES # BLD AUTO: 1.23 X10(3) UL (ref 1–4)
LYMPHOCYTES NFR BLD AUTO: 10.7 %
MCH RBC QN AUTO: 21.4 PG (ref 26–34)
MCHC RBC AUTO-ENTMCNC: 30 G/DL (ref 31–37)
MCV RBC AUTO: 71.2 FL
MONOCYTES # BLD AUTO: 0.29 X10(3) UL (ref 0.1–1)
MONOCYTES NFR BLD AUTO: 2.5 %
NEUTROPHILS # BLD AUTO: 9.81 X10 (3) UL (ref 1.5–7.7)
NEUTROPHILS # BLD AUTO: 9.81 X10(3) UL (ref 1.5–7.7)
NEUTROPHILS NFR BLD AUTO: 85.7 %
PLATELET # BLD AUTO: 418 10(3)UL (ref 150–450)
RBC # BLD AUTO: 4.59 X10(6)UL
RH BLOOD TYPE: POSITIVE
WBC # BLD AUTO: 11.5 X10(3) UL (ref 4–11)

## 2023-11-05 PROCEDURE — 59409 OBSTETRICAL CARE: CPT | Performed by: OBSTETRICS & GYNECOLOGY

## 2023-11-05 PROCEDURE — 0HQ9XZZ REPAIR PERINEUM SKIN, EXTERNAL APPROACH: ICD-10-PCS | Performed by: OBSTETRICS & GYNECOLOGY

## 2023-11-05 RX ORDER — ONDANSETRON 2 MG/ML
4 INJECTION INTRAMUSCULAR; INTRAVENOUS EVERY 6 HOURS PRN
Status: DISCONTINUED | OUTPATIENT
Start: 2023-11-05 | End: 2023-11-05

## 2023-11-05 RX ORDER — TRANEXAMIC ACID 10 MG/ML
1000 INJECTION, SOLUTION INTRAVENOUS ONCE AS NEEDED
Status: ACTIVE | OUTPATIENT
Start: 2023-11-05 | End: 2023-11-05

## 2023-11-05 RX ORDER — TERBUTALINE SULFATE 1 MG/ML
0.25 INJECTION, SOLUTION SUBCUTANEOUS AS NEEDED
Status: DISCONTINUED | OUTPATIENT
Start: 2023-11-05 | End: 2023-11-06 | Stop reason: HOSPADM

## 2023-11-05 RX ORDER — ACETAMINOPHEN 500 MG
1000 TABLET ORAL EVERY 6 HOURS PRN
Status: DISCONTINUED | OUTPATIENT
Start: 2023-11-05 | End: 2023-11-05

## 2023-11-05 RX ORDER — SODIUM CHLORIDE, SODIUM LACTATE, POTASSIUM CHLORIDE, CALCIUM CHLORIDE 600; 310; 30; 20 MG/100ML; MG/100ML; MG/100ML; MG/100ML
INJECTION, SOLUTION INTRAVENOUS AS NEEDED
Status: DISCONTINUED | OUTPATIENT
Start: 2023-11-05 | End: 2023-11-06 | Stop reason: HOSPADM

## 2023-11-05 RX ORDER — AMMONIA INHALANTS 0.04 G/.3ML
0.3 INHALANT RESPIRATORY (INHALATION) AS NEEDED
Status: DISCONTINUED | OUTPATIENT
Start: 2023-11-05 | End: 2023-11-07

## 2023-11-05 RX ORDER — IBUPROFEN 600 MG/1
600 TABLET ORAL ONCE AS NEEDED
Status: DISCONTINUED | OUTPATIENT
Start: 2023-11-05 | End: 2023-11-06 | Stop reason: HOSPADM

## 2023-11-05 RX ORDER — DIAPER,BRIEF,INFANT-TODD,DISP
1 EACH MISCELLANEOUS EVERY 6 HOURS PRN
Status: DISCONTINUED | OUTPATIENT
Start: 2023-11-05 | End: 2023-11-07

## 2023-11-05 RX ORDER — CITRIC ACID/SODIUM CITRATE 334-500MG
30 SOLUTION, ORAL ORAL AS NEEDED
Status: DISCONTINUED | OUTPATIENT
Start: 2023-11-05 | End: 2023-11-06 | Stop reason: HOSPADM

## 2023-11-05 RX ORDER — SIMETHICONE 80 MG
80 TABLET,CHEWABLE ORAL 3 TIMES DAILY PRN
Status: DISCONTINUED | OUTPATIENT
Start: 2023-11-05 | End: 2023-11-07

## 2023-11-05 RX ORDER — DOCUSATE SODIUM 100 MG/1
100 CAPSULE, LIQUID FILLED ORAL
Status: DISCONTINUED | OUTPATIENT
Start: 2023-11-06 | End: 2023-11-07

## 2023-11-05 RX ORDER — DEXTROSE, SODIUM CHLORIDE, SODIUM LACTATE, POTASSIUM CHLORIDE, AND CALCIUM CHLORIDE 5; .6; .31; .03; .02 G/100ML; G/100ML; G/100ML; G/100ML; G/100ML
INJECTION, SOLUTION INTRAVENOUS CONTINUOUS
Status: DISCONTINUED | OUTPATIENT
Start: 2023-11-05 | End: 2023-11-06 | Stop reason: HOSPADM

## 2023-11-05 RX ORDER — ACETAMINOPHEN 500 MG
500 TABLET ORAL EVERY 6 HOURS PRN
Status: DISCONTINUED | OUTPATIENT
Start: 2023-11-05 | End: 2023-11-05

## 2023-11-05 RX ORDER — ACETAMINOPHEN 500 MG
1000 TABLET ORAL EVERY 6 HOURS PRN
Status: DISCONTINUED | OUTPATIENT
Start: 2023-11-05 | End: 2023-11-07

## 2023-11-05 RX ORDER — ONDANSETRON 2 MG/ML
4 INJECTION INTRAMUSCULAR; INTRAVENOUS EVERY 6 HOURS PRN
Status: DISCONTINUED | OUTPATIENT
Start: 2023-11-05 | End: 2023-11-07

## 2023-11-05 RX ORDER — IBUPROFEN 600 MG/1
600 TABLET ORAL EVERY 6 HOURS
Status: DISCONTINUED | OUTPATIENT
Start: 2023-11-05 | End: 2023-11-07

## 2023-11-05 RX ORDER — ACETAMINOPHEN 500 MG
500 TABLET ORAL EVERY 6 HOURS PRN
Status: DISCONTINUED | OUTPATIENT
Start: 2023-11-05 | End: 2023-11-07

## 2023-11-05 RX ORDER — LIDOCAINE HYDROCHLORIDE 10 MG/ML
30 INJECTION, SOLUTION EPIDURAL; INFILTRATION; INTRACAUDAL; PERINEURAL ONCE
Status: DISCONTINUED | OUTPATIENT
Start: 2023-11-05 | End: 2023-11-06 | Stop reason: HOSPADM

## 2023-11-05 NOTE — H&P
[address] [address] Patient Status:  Outpatient    2000 MRN [id number]   Location [address] [place] [address] Attending Cece, 3 [address] Virat Fang Day # 0 PCP No primary care provider on file. Date of Admission:  2023      HPI:   Zofia Ybarra is a 26year old  female, current EGA of 39w2d with an estimated date of delivery of: 11/10/2023, by Last Menstrual Period      Zofia Ybarra is being admitted for labor management. Her current obstetrical history is significant for obesity, abnormal pap, anemia and recent non-compliance with visits in 3rd trimester  Patient reports contractions since midnight. I spoke with her around 2 AM and reviewed labor instructions. She presented with worsening contraction pain. No LOF or blood. .   Low risk male on NIPT. She is Rh positive, rubella immune, serology and GBS negative. Normal level 2 and growth US's. Last at 63% on 23. Fetal Movement: good    History   Obstetric History:   OB History    Para Term  AB Living   1             SAB IAB Ectopic Multiple Live Births                  # Outcome Date GA Lbr Aquiles/2nd Weight Sex Delivery Anes PTL Lv   1 Current              Past Medical History:   Past Medical History:   Diagnosis Date    Obesity (BMI 30.0-34. 9)      Past Surgerical History: History reviewed. No pertinent surgical history. Social History: Social History    Tobacco Use      Smoking status: Never      Smokeless tobacco: Never    Alcohol use: Not Currently       Allergies/Medications:    Allergies:   Not on File  Medications:  Ferrous Gluconate 324 (38 Fe) MG Oral Tab, Take 1 tablet (325 mg total) by mouth daily with breakfast., Disp: , Rfl:   Prenatal Vit-Fe Sulfate-FA (PRENATAL VITAMIN OR), Take by mouth., Disp: , Rfl:         Review of Systems:   As documented in HPI      Physical Exam:   Pulse:  [74] 74  BP: (124)/(82) 124/82    Constitutional: alert and cooperative in moderate distress  Abdomen: gravid nontender  Vaginal exam:  Dilation: 4.5 cm    Effacement: 100 %    Station: -2    Position: Cephalic    With bulging membranes. FHT assessment:    Baseline: 125 bpm   Variability: moderate   Accels:  present   Decels: No   Tocos:  contractions every 3 minute   Category: 1 tracing    Results:     Needs to be collected       Assessment/Plan:     Normal labor        Treatment Plan:  Intervention: Admit and expectant management. May need augmentation. Kingston VERA  [address] [place] [address]  11/5/2023  3:53 PM

## 2023-11-05 NOTE — PROGRESS NOTES
Pt is a 26year old female admitted to [place] [address]. Patient presents with:  Contractions: Contractions since 02:00 that have become stronger and are now 2-3 minutes apart     Pt is  39w2d intra-uterine pregnancy. History obtained, consents signed. Oriented to room, staff, and plan of care.

## 2023-11-05 NOTE — PLAN OF CARE
Problem: BIRTH - VAGINAL/ SECTION  Goal: Fetal and maternal status remain reassuring during the birth process  Description: INTERVENTIONS:  - Monitor vital signs  - Monitor fetal heart rate  - Monitor uterine activity  - Monitor labor progression (vaginal delivery)  - DVT prophylaxis (C/S delivery)  - Surgical antibiotic prophylaxis (C/S delivery)  Outcome: Progressing     Problem: PAIN - ADULT  Goal: Verbalizes/displays adequate comfort level or patient's stated pain goal  Description: INTERVENTIONS:  - Encourage pt to monitor pain and request assistance  - Assess pain using appropriate pain scale  - Administer analgesics based on type and severity of pain and evaluate response  - Implement non-pharmacological measures as appropriate and evaluate response  - Consider cultural and social influences on pain and pain management  - Manage/alleviate anxiety  - Utilize distraction and/or relaxation techniques  - Monitor for opioid side effects  - Notify MD/LIP if interventions unsuccessful or patient reports new pain  - Anticipate increased pain with activity and pre-medicate as appropriate  Outcome: Progressing     Problem: ANXIETY  Goal: Will report anxiety at manageable levels  Description: INTERVENTIONS:  - Administer medication as ordered  - Teach and rehearse alternative coping skills  - Provide emotional support with 1:1 interaction with staff  Outcome: Progressing     Problem: Patient Centered Care  Goal: Patient preferences are identified and integrated in the patient's plan of care  Description: Interventions:  - What would you like us to know as we care for you?  Were having a boy!  - Provide timely, complete, and accurate information to patient/family  - Incorporate patient and family knowledge, values, beliefs, and cultural backgrounds into the planning and delivery of care  - Encourage patient/family to participate in care and decision-making at the level they choose  - Honor patient and family perspectives and choices  Outcome: Progressing     Problem: Patient/Family Goals  Goal: Patient/Family Long Term Goal  Description: Patient's Long Term Goal: Uncomplicated Delivery     Interventions:  - Assessment/Monitoring  - Induction/Augmentation per protocol and Provider order  - C/S per protocol and Provider order   - Education  - Intervention per protocol and Provider order with education   - Involve patient in POC  - See additional Care Plan goals for specific interventions  Outcome: Progressing  Goal: Patient/Family Short Term Goal  Description: Patient's Short Term Goal: Comfort and Pain Control     Interventions:   - Non Pharmacological pain intervention   - IV/IM and Epidural pain medication per Provider order and patient request  - Education  - Involve Patient in POC   - See additional Care Plan goals for specific interventions  Outcome: Progressing

## 2023-11-05 NOTE — PROGRESS NOTES
Pt is a 26year old female admitted to [facility]. Patient presents with:  Contractions: Contractions since 02:00 that have become stronger and are now 2-3 minutes apart     Pt is  39w2d intra-uterine pregnancy. History obtained, consents signed. Oriented to room, staff, and plan of care.

## 2023-11-06 ENCOUNTER — NST DOCUMENTATION (OUTPATIENT)
Dept: OBGYN CLINIC | Facility: CLINIC | Age: 23
End: 2023-11-06

## 2023-11-06 LAB
BASOPHILS # BLD AUTO: 0.02 X10(3) UL (ref 0–0.2)
BASOPHILS NFR BLD AUTO: 0.1 %
DEPRECATED RDW RBC AUTO: 46.1 FL (ref 35.1–46.3)
EOSINOPHIL # BLD AUTO: 0 X10(3) UL (ref 0–0.7)
EOSINOPHIL NFR BLD AUTO: 0 %
ERYTHROCYTE [DISTWIDTH] IN BLOOD BY AUTOMATED COUNT: 17.6 % (ref 11–15)
HCT VFR BLD AUTO: 27 %
HGB BLD-MCNC: 7.9 G/DL
IMM GRANULOCYTES # BLD AUTO: 0.1 X10(3) UL (ref 0–1)
IMM GRANULOCYTES NFR BLD: 0.6 %
LYMPHOCYTES # BLD AUTO: 1.48 X10(3) UL (ref 1–4)
LYMPHOCYTES NFR BLD AUTO: 8.4 %
MCH RBC QN AUTO: 21.3 PG (ref 26–34)
MCHC RBC AUTO-ENTMCNC: 29.3 G/DL (ref 31–37)
MCV RBC AUTO: 72.8 FL
MONOCYTES # BLD AUTO: 1.09 X10(3) UL (ref 0.1–1)
MONOCYTES NFR BLD AUTO: 6.2 %
NEUTROPHILS # BLD AUTO: 14.89 X10 (3) UL (ref 1.5–7.7)
NEUTROPHILS # BLD AUTO: 14.89 X10(3) UL (ref 1.5–7.7)
NEUTROPHILS NFR BLD AUTO: 84.7 %
PLATELET # BLD AUTO: 362 10(3)UL (ref 150–450)
RBC # BLD AUTO: 3.71 X10(6)UL
WBC # BLD AUTO: 17.6 X10(3) UL (ref 4–11)

## 2023-11-06 RX ORDER — CHOLECALCIFEROL (VITAMIN D3) 25 MCG
1 TABLET,CHEWABLE ORAL DAILY
Status: DISCONTINUED | OUTPATIENT
Start: 2023-11-06 | End: 2023-11-07

## 2023-11-06 NOTE — PROGRESS NOTES
Patient received into room 359 via . Beside report received from [facility]. Patient transferred to bed without incident. Bed in locked and low position. Side rails up x 2. VSS. Fundus firm at u/u, lochia small, no clots noted. IV site unremarkable. Pain level 2. Baby boy present at bedside in open crib, ID bands checked and verified. Patient/family oriented to unit, room and call light. Call light within patient reach. Reinforced to patient to call for assistance before getting out of bed to bathroom. Plan of care reviewed. Will continue to monitor per protocol.

## 2023-11-06 NOTE — NST
Nonstress Test   Patient: Lina Finch    Gestation: 39w2d    Diagnosis from order:         NST:        10/31/2023   NST DOCUMENTATION   Variability 6-25 BPM   Accelerations Yes   Decelerations None   Baseline 120 BPM   Uterine Irritability Yes   Contractions Irregular   Acoustic Stimulator No   Nonstress Test Interpretation Reactive   NST Completed by Wanda SHEN   Disposition Home    Testing Plan Weekly NST   Provider Notified Tessy ESPINAL         I agree with the above evaluation. NST completed. Mayela Still MD  2023  9:48 AM

## 2023-11-06 NOTE — CM/SW NOTE
SW self referral due to finances/WIC resources    SW met with patient and FOB  bedside. SW confirmed face sheet contact as correct. Baby boy/girl name:Baby boy [place]  Date & time of delivery:11/5/23 @ 9:54pm  Delivery method:Normal spontaneous vaginal delivery   Siblings age:n/a    Patient employed: Denied  Length of maternity leave:n/a    Father of baby employed:Yes  Length of paternity leave:1 week    Breast or formula feed:Breast and formula feed    Pediatrician:MARCUS  SW encouraged pt to schedule infant first appointment (usually within 48 hours of discharge) prior to pt discharge. Pt expressed understanding. Infant Insurance:Medicaid  [facility] contacted:Yes    Mental Health History:Denied     Medications:n/a    Therapist:n/a    Psychiatrist:n/a    SW discussed signs, symptoms and risks associated with post partum depression & anxiety. SW provided pt with PMAD resources. Other resources provided:Medicaid transportation and [facility] [employer]. Pt endorses she is current w/WIC services and was encouraged to contact them informing of infants birth. Pt expressed understanding. Patient support system:FOB    Patient denied current questions/needs from SW.    SW/CM to remain available for support and/or discharge planning.       Mary Calero, MSW, [facility]  Social Work   MRW:#[id number]

## 2023-11-06 NOTE — PLAN OF CARE
Problem: BIRTH - VAGINAL/ SECTION  Goal: Fetal and maternal status remain reassuring during the birth process  Description: INTERVENTIONS:  - Monitor vital signs  - Monitor fetal heart rate  - Monitor uterine activity  - Monitor labor progression (vaginal delivery)  - DVT prophylaxis (C/S delivery)  - Surgical antibiotic prophylaxis (C/S delivery)  2023 by Yeny Gallegos RN  Outcome: Completed  2023 by Yeny Gallegos RN  Outcome: Progressing     Problem: PAIN - ADULT  Goal: Verbalizes/displays adequate comfort level or patient's stated pain goal  Description: INTERVENTIONS:  - Encourage pt to monitor pain and request assistance  - Assess pain using appropriate pain scale  - Administer analgesics based on type and severity of pain and evaluate response  - Implement non-pharmacological measures as appropriate and evaluate response  - Consider cultural and social influences on pain and pain management  - Manage/alleviate anxiety  - Utilize distraction and/or relaxation techniques  - Monitor for opioid side effects  - Notify MD/LIP if interventions unsuccessful or patient reports new pain  - Anticipate increased pain with activity and pre-medicate as appropriate  2023 by Yeny Gallegos RN  Outcome: Progressing  2023 by Yeny Gallegos RN  Outcome: Progressing     Problem: ANXIETY  Goal: Will report anxiety at manageable levels  Description: INTERVENTIONS:  - Administer medication as ordered  - Teach and rehearse alternative coping skills  - Provide emotional support with 1:1 interaction with staff  2023 by Yeyn Gallegos RN  Outcome: Progressing  2023 by Yeny Gallegos RN  Outcome: Progressing     Problem: Patient Centered Care  Goal: Patient preferences are identified and integrated in the patient's plan of care  Description: Interventions:  - What would you like us to know as we care for you?   - Provide timely, complete, and accurate information to patient/family  - Incorporate patient and family knowledge, values, beliefs, and cultural backgrounds into the planning and delivery of care  - Encourage patient/family to participate in care and decision-making at the level they choose  - Honor patient and family perspectives and choices  11/6/2023 0958 by Anna Marie Worthy RN  Outcome: Progressing  11/6/2023 0908 by Anna Marie Worthy RN  Outcome: Progressing     Problem: Patient/Family Goals  Goal: Patient/Family Long Term Goal  Description: Patient's Long Term Goal:     Interventions:  -   - See additional Care Plan goals for specific interventions  11/6/2023 0958 by Anna Marie Worthy RN  Outcome: Progressing  11/6/2023 0908 by Anna Marie Worthy RN  Outcome: Progressing  Goal: Patient/Family Short Term Goal  Description: Patient's Short Term Goal:     Interventions:   -   - See additional Care Plan goals for specific interventions  11/6/2023 0958 by Anna Marie Worthy RN  Outcome: Progressing  11/6/2023 0908 by Anna Marie Worthy RN  Outcome: Progressing     Problem: POSTPARTUM  Goal: Long Term Goal:Experiences normal postpartum course  Description: INTERVENTIONS:  - Assess and monitor vital signs and lab values. - Assess fundus and lochia. - Provide ice/sitz baths for perineum discomfort. - Monitor healing of incision/episiotomy/laceration, and assess for signs and symptoms of infection and hematoma. - Assess bladder function and monitor for bladder distention.  - Provide/instruct/assist with pericare as needed. - Provide VTE prophylaxis as needed. - Monitor bowel function.  - Encourage ambulation and provide assistance as needed. - Assess and monitor emotional status and provide social service/psych resources as needed. - Utilize standard precautions and use personal protective equipment as indicated.  Ensure aseptic care of all intravenous lines and invasive tubes/drains.  - Obtain immunization and exposure to communicable diseases history. 11/6/2023 0958 by Graciela Gloria RN  Outcome: Progressing  11/6/2023 0908 by Graciela Gloria RN  Outcome: Progressing  Goal: Optimize infant feeding at the breast  Description: INTERVENTIONS:  - Initiate breast feeding within first hour after birth. - Monitor effectiveness of current breast feeding efforts. - Assess support systems available to mother/family.  - Identify cultural beliefs/practices regarding lactation, letdown techniques, maternal food preferences. - Assess mother's knowledge and previous experience with breast feeding.  - Provide information as needed about early infant feeding cues (e.g., rooting, lip smacking, sucking fingers/hand) versus late cue of crying.  - Discuss/demonstrate breast feeding aids (e.g., infant sling, nursing footstool/pillows, and breast pumps). - Encourage mother/other family members to express feelings/concerns, and actively listen. - Educate father/SO about benefits of breast feeding and how to manage common lactation challenges. - Recommend avoidance of specific medications or substances incompatible with breast feeding.  - Assess and monitor for signs of nipple pain/trauma. - Instruct and provide assistance with proper latch. - Review techniques for milk expression (breast pumping) and storage of breast milk. Provide pumping equipment/supplies, instructions and assistance, as needed. - Encourage rooming-in and breast feeding on demand.  - Encourage skin-to-skin contact. - Provide LC support as needed. - Assess for and manage engorgement. - Provide breast feeding education handouts and information on community breast feeding support.    11/6/2023 0958 by Graciela Gloria RN  Outcome: Progressing  11/6/2023 0908 by Graciela Gloria RN  Outcome: Progressing  Goal: Establishment of adequate milk supply with medication/procedure interruptions  Description: INTERVENTIONS:  - Review techniques for milk expression (breast pumping). - Provide pumping equipment/supplies, instructions, and assistance until it is safe to breastfeed infant. 2023 by Brock Corral RN  Outcome: Progressing  2023 by Brock Corral RN  Outcome: Progressing  Goal: Appropriate maternal -  bonding  Description: INTERVENTIONS:  - Assess caregiver- interactions. - Assess caregiver's emotional status and coping mechanisms. - Encourage caregiver to participate in  daily care. - Assess support systems available to mother/family.  - Provide /case management support as needed.   2023 by Brock Corral RN  Outcome: Progressing  2023 by Brock Corral RN  Outcome: Progressing

## 2023-11-06 NOTE — PROGRESS NOTES
Patient up to bathroom with assist x 2. Unable to void at this time. Pt was straight cathed 50cc at [address] by Dr. Radha Bolden. Patient transferred to mother/baby room 359 per wheelchair in stable condition with baby and personal belongings. Accompanied by significant other and staff. Report given to Yi Armstrong, mother/baby RN.

## 2023-11-06 NOTE — PLAN OF CARE
Problem: BIRTH - VAGINAL/ SECTION  Goal: Fetal and maternal status remain reassuring during the birth process  Description: INTERVENTIONS:  - Monitor vital signs  - Monitor fetal heart rate  - Monitor uterine activity  - Monitor labor progression (vaginal delivery)  - DVT prophylaxis (C/S delivery)  - Surgical antibiotic prophylaxis (C/S delivery)  Outcome: Progressing     Problem: PAIN - ADULT  Goal: Verbalizes/displays adequate comfort level or patient's stated pain goal  Description: INTERVENTIONS:  - Encourage pt to monitor pain and request assistance  - Assess pain using appropriate pain scale  - Administer analgesics based on type and severity of pain and evaluate response  - Implement non-pharmacological measures as appropriate and evaluate response  - Consider cultural and social influences on pain and pain management  - Manage/alleviate anxiety  - Utilize distraction and/or relaxation techniques  - Monitor for opioid side effects  - Notify MD/LIP if interventions unsuccessful or patient reports new pain  - Anticipate increased pain with activity and pre-medicate as appropriate  Outcome: Progressing     Problem: ANXIETY  Goal: Will report anxiety at manageable levels  Description: INTERVENTIONS:  - Administer medication as ordered  - Teach and rehearse alternative coping skills  - Provide emotional support with 1:1 interaction with staff  Outcome: Progressing     Problem: Patient Centered Care  Goal: Patient preferences are identified and integrated in the patient's plan of care  Description: Interventions:  - What would you like us to know as we care for you?   - Provide timely, complete, and accurate information to patient/family  - Incorporate patient and family knowledge, values, beliefs, and cultural backgrounds into the planning and delivery of care  - Encourage patient/family to participate in care and decision-making at the level they choose  - Honor patient and family perspectives and choices  Outcome: Progressing     Problem: Patient/Family Goals  Goal: Patient/Family Long Term Goal  Description: Patient's Long Term Goal:     Interventions:  -   - See additional Care Plan goals for specific interventions  Outcome: Progressing  Goal: Patient/Family Short Term Goal  Description: Patient's Short Term Goal:     Interventions:   -   - See additional Care Plan goals for specific interventions  Outcome: Progressing     Problem: POSTPARTUM  Goal: Long Term Goal:Experiences normal postpartum course  Description: INTERVENTIONS:  - Assess and monitor vital signs and lab values. - Assess fundus and lochia. - Provide ice/sitz baths for perineum discomfort. - Monitor healing of incision/episiotomy/laceration, and assess for signs and symptoms of infection and hematoma. - Assess bladder function and monitor for bladder distention.  - Provide/instruct/assist with pericare as needed. - Provide VTE prophylaxis as needed. - Monitor bowel function.  - Encourage ambulation and provide assistance as needed. - Assess and monitor emotional status and provide social service/psych resources as needed. - Utilize standard precautions and use personal protective equipment as indicated. Ensure aseptic care of all intravenous lines and invasive tubes/drains.  - Obtain immunization and exposure to communicable diseases history. Outcome: Progressing  Goal: Optimize infant feeding at the breast  Description: INTERVENTIONS:  - Initiate breast feeding within first hour after birth. - Monitor effectiveness of current breast feeding efforts. - Assess support systems available to mother/family.  - Identify cultural beliefs/practices regarding lactation, letdown techniques, maternal food preferences.   - Assess mother's knowledge and previous experience with breast feeding.  - Provide information as needed about early infant feeding cues (e.g., rooting, lip smacking, sucking fingers/hand) versus late cue of crying.  - Discuss/demonstrate breast feeding aids (e.g., infant sling, nursing footstool/pillows, and breast pumps). - Encourage mother/other family members to express feelings/concerns, and actively listen. - Educate father/SO about benefits of breast feeding and how to manage common lactation challenges. - Recommend avoidance of specific medications or substances incompatible with breast feeding.  - Assess and monitor for signs of nipple pain/trauma. - Instruct and provide assistance with proper latch. - Review techniques for milk expression (breast pumping) and storage of breast milk. Provide pumping equipment/supplies, instructions and assistance, as needed. - Encourage rooming-in and breast feeding on demand.  - Encourage skin-to-skin contact. - Provide LC support as needed. - Assess for and manage engorgement. - Provide breast feeding education handouts and information on community breast feeding support. Outcome: Progressing  Goal: Establishment of adequate milk supply with medication/procedure interruptions  Description: INTERVENTIONS:  - Review techniques for milk expression (breast pumping). - Provide pumping equipment/supplies, instructions, and assistance until it is safe to breastfeed infant. Outcome: Progressing  Goal: Appropriate maternal -  bonding  Description: INTERVENTIONS:  - Assess caregiver- interactions. - Assess caregiver's emotional status and coping mechanisms. - Encourage caregiver to participate in  daily care. - Assess support systems available to mother/family.  - Provide /case management support as needed.   Outcome: Progressing

## 2023-11-06 NOTE — PLAN OF CARE
Problem: BIRTH - VAGINAL/ SECTION  Goal: Fetal and maternal status remain reassuring during the birth process  Description: INTERVENTIONS:  - Monitor vital signs  - Monitor fetal heart rate  - Monitor uterine activity  - Monitor labor progression (vaginal delivery)  - DVT prophylaxis (C/S delivery)  - Surgical antibiotic prophylaxis (C/S delivery)  Outcome: Progressing     Problem: PAIN - ADULT  Goal: Verbalizes/displays adequate comfort level or patient's stated pain goal  Description: INTERVENTIONS:  - Encourage pt to monitor pain and request assistance  - Assess pain using appropriate pain scale  - Administer analgesics based on type and severity of pain and evaluate response  - Implement non-pharmacological measures as appropriate and evaluate response  - Consider cultural and social influences on pain and pain management  - Manage/alleviate anxiety  - Utilize distraction and/or relaxation techniques  - Monitor for opioid side effects  - Notify MD/LIP if interventions unsuccessful or patient reports new pain  - Anticipate increased pain with activity and pre-medicate as appropriate  Outcome: Progressing     Problem: ANXIETY  Goal: Will report anxiety at manageable levels  Description: INTERVENTIONS:  - Administer medication as ordered  - Teach and rehearse alternative coping skills  - Provide emotional support with 1:1 interaction with staff  Outcome: Progressing     Problem: Patient Centered Care  Goal: Patient preferences are identified and integrated in the patient's plan of care  Description: Interventions:  - What would you like us to know as we care for you? Having a boy named [place]. Does not want a circumcision for baby.   - Provide timely, complete, and accurate information to patient/family  - Incorporate patient and family knowledge, values, beliefs, and cultural backgrounds into the planning and delivery of care  - Encourage patient/family to participate in care and decision-making at the level they choose  - Honor patient and family perspectives and choices  Outcome: Progressing     Problem: Patient/Family Goals  Goal: Patient/Family Long Term Goal  Description: Patient's Long Term Goal: Healthy baby and healthy mother via vaginal delivery.      Interventions:  - See additional Care Plan goals for specific interventions  Outcome: Progressing  Goal: Patient/Family Short Term Goal  Description: Patient's Short Term Goal: Pain management    Interventions:   - PRN pain medications  - See additional Care Plan goals for specific interventions  Outcome: Progressing

## 2023-11-06 NOTE — PROGRESS NOTES
[facility]    Vaginal Delivery Note    Luis Alfredofelipe Kelley Patient Status:  Inpatient    2000 MRN [id number]   Location [address] Attending Cece, 3 [facility] Day # 0 PCP No primary care provider on file. Delivery     Infant Info:  Date of Delivery: 2023   Time of Delivery: 9:54 PM  Delivery Type: Normal spontaneous vaginal delivery    Live Information for the patient's : Maninder Cough [[id number]]   male infant Information for the patient's : Rahul Acevedo [[id number]]   7 lb 11.5 oz (3.5 kg)   Apgars:  1 minute: 8  Paulino               5 minutes: 9                        10 minutes:      Presentation Vertex [1]    Position Right [3] Occiput [1] Anterior [1]    Delivery Narrative: Patient pushed for 14 minutes prior to delivering a live male infant over intact perineum in YENNIFER position. Loose nuchal cord reduced Infant was bulb suctioned prior to delivering in toto. Cord doubly clamped & cut. Infant handed to awaiting mother. Placenta delivered spontaneously, intact and normal in appearance with 3 vessel cord. First degree laceration repaired with 2-0 Chromic in normal usual fashion. No sulcus, periuretheral, nor cervical lacerations noted. Mother in stable condition. Maternal Anesthesia: Pudendal block prior to delivery and Lidocaine local for repair. Delivery Complications:  none    Neonatologist Present: no    Placenta info:  Date/Time of Delivery: 2023 10:01 PM   Delivery: spontaneous  Placenta to Pathology: no    Cord info:  Cord Gases Submitted: no  Cord Blood Collection: no  Cord Tissue Collection: no  Cord Complications: single nuchal    Sponge and Needle Counts:  Verified    QBL:  390 ml    [place] VERA  [id number] [facility]   2023  10:30 PM

## 2023-11-06 NOTE — TELEPHONE ENCOUNTER
tonight. Needs appt canceled and then around 6 weeks with me. OK to go week after [place] if needed.

## 2023-11-07 VITALS
TEMPERATURE: 99 F | BODY MASS INDEX: 35.5 KG/M2 | DIASTOLIC BLOOD PRESSURE: 78 MMHG | HEIGHT: 61 IN | WEIGHT: 188 LBS | HEART RATE: 63 BPM | SYSTOLIC BLOOD PRESSURE: 114 MMHG | RESPIRATION RATE: 16 BRPM

## 2023-11-07 NOTE — LACTATION NOTE
This note was copied from a baby's chart. LACTATION NOTE - INFANT    Evaluation Type  Evaluation Type: Inpatient    Problems & Assessment  Problems Diagnosed or Identified: Shallow latch  Infant Assessment: Hunger cues present  Muscle tone: Appropriate for GA    Feeding Assessment  Summary Current Feeding: Adlib;Breastfeeding exclusively  Breastfeeding Assessment: Assisted with breastfeeding w/mother's permission;Sustained nutrititive latch w/audible swallows; Coordinated suck/swallow; Tolerated feeding well  Breastfeeding Positions: right breast  Latch: Repeated attempts, hold nipple in mouth, stimulate to suck  Audible Sucks/Swallows: Spontaneous and intermittent (24 hours old)  Type of Nipple: Everted (after stimulation)  Comfort (Breast/Nipple): Soft/non-tender  Hold (Positioning): Full assist, teach one side, mother does other, staff holds  [facility] Score: 8  Other (comment): Mom independently BF, instructed on techniques for obtaining deeper latch. Mom able to return demonstration, frequent suckings bursts observed with few swallows heard.

## 2023-11-07 NOTE — PLAN OF CARE
Problem: POSTPARTUM  Goal: Long Term Goal:Experiences normal postpartum course  Description: INTERVENTIONS:  - Assess and monitor vital signs and lab values. - Assess fundus and lochia. - Provide ice/sitz baths for perineum discomfort. - Monitor healing of incision/episiotomy/laceration, and assess for signs and symptoms of infection and hematoma. - Assess bladder function and monitor for bladder distention.  - Provide/instruct/assist with pericare as needed. - Provide VTE prophylaxis as needed. - Monitor bowel function.  - Encourage ambulation and provide assistance as needed. - Assess and monitor emotional status and provide social service/psych resources as needed. - Utilize standard precautions and use personal protective equipment as indicated. Ensure aseptic care of all intravenous lines and invasive tubes/drains.  - Obtain immunization and exposure to communicable diseases history. Outcome: Completed     Problem: POSTPARTUM  Goal: Optimize infant feeding at the breast  Description: INTERVENTIONS:  - Initiate breast feeding within first hour after birth. - Monitor effectiveness of current breast feeding efforts. - Assess support systems available to mother/family.  - Identify cultural beliefs/practices regarding lactation, letdown techniques, maternal food preferences. - Assess mother's knowledge and previous experience with breast feeding.  - Provide information as needed about early infant feeding cues (e.g., rooting, lip smacking, sucking fingers/hand) versus late cue of crying.  - Discuss/demonstrate breast feeding aids (e.g., infant sling, nursing footstool/pillows, and breast pumps). - Encourage mother/other family members to express feelings/concerns, and actively listen. - Educate father/SO about benefits of breast feeding and how to manage common lactation challenges.   - Recommend avoidance of specific medications or substances incompatible with breast feeding.  - Assess and monitor REVIEW OF SYSTEMS:  Constitutional: Negative  Integumentary problem(s):negative  HEENT Problem(s): Negative  Cardiovascular problem(s): Negative  Respiratory problem(s): cough, shortness of breath  Gastro-intestinal problem(s): none  Genito-urinary problem(s): Negative  Musculoskeletal problem(s): Negative  Neurological problem(s): Negative  Psychiatric problem(s): Negative  Endocrine problem(s): Negative  Hematologic and/or Lymphatic problem(s): Negative    Patient does not smoke.    Patient does take aspirin.     for signs of nipple pain/trauma. - Instruct and provide assistance with proper latch. - Review techniques for milk expression (breast pumping) and storage of breast milk. Provide pumping equipment/supplies, instructions and assistance, as needed. - Encourage rooming-in and breast feeding on demand.  - Encourage skin-to-skin contact. - Provide LC support as needed. - Assess for and manage engorgement. - Provide breast feeding education handouts and information on community breast feeding support. Outcome: Completed     Problem: POSTPARTUM  Goal: Establishment of adequate milk supply with medication/procedure interruptions  Description: INTERVENTIONS:  - Review techniques for milk expression (breast pumping). - Provide pumping equipment/supplies, instructions, and assistance until it is safe to breastfeed infant.   Outcome: Completed

## 2023-11-07 NOTE — DISCHARGE INSTRUCTIONS
Pelvic rest for 6 weeks. No sex, tampons, nothing in the vagina. No sex, tampons, hot tubs or jacuzzis. Call Md for any questions or concerns including: temp over 100.4, increased pain, increased bleeding or any signs of postpartum depression. [facility] has great support for our families even after discharge. We have virtual or in-person support groups. Visit our website for the most up-to-date info for our many different support groups. [email or web address]       Outpatient Lactation appoints. Call [phone]- to schedule an appt. Our office is located in the Maternal Fetal Medicine office next to [facility] on the first floor. New Moms Support Groups  Our weekly New Mom Support Groups are for any new parents in our community. They are led by an experienced Mother/Baby nurse or IBCLC and usually include a guest speaker on a topic of interest to new parents. These in-person groups also include Breastfeeding Support at each meeting. Bring your baby ( - 6 months) with you! Moms-to-be are also welcome! All mom's welcome even if its not your first.     MOM & BABY HOUR   Meets most  10:00 - 11:30 a.m. Masks are not required, but be considerate of others and do not attend if mom or baby have had any symptoms of illness within the previous 24 hours. Breastfeeding support will be included at each session--just ask the leader any breastfeeding questions you may have. Location [facility] [place] [address] [place] Go inside the front door and to the right to the [facility]. Mom's Line: [phone]   This service is provided by [facility], has a phone line dedicated for women (or anyone worried about a women) who may be experiencing signs or symptoms of postpartum depression.     Nurturing Mom- A support group for new and expectant moms looking for support with the transition to parenthood as well as those experiencing symptoms of  anxiety and/or depression. Please contact [email or web address] if you need directions or the link for the virtual meetings. Please contact [email or web address] if you plan to attend, but please be considerate of others and do not attend if mom or baby have had any symptoms of illness within the previous 24 hours. Simone Workman for breast feeding and parent support, Website: IIIus.org  and for the [address] [place] [address] group and other groups visit [email or web address].[email or web address]/[email or web address]  to help find a group, all meetings are virtual.     Facebook groups-  for more support when home- [address] of [facility] --- you can find mom-to-mom advice and the list of speaker topics for cradle talk program.     Helpful websites:    www.llli. org  www. [employer]  www. Breastfeed[place]. org

## 2023-12-18 ENCOUNTER — POSTPARTUM (OUTPATIENT)
Dept: OBGYN CLINIC | Facility: CLINIC | Age: 23
End: 2023-12-18

## 2023-12-18 VITALS
SYSTOLIC BLOOD PRESSURE: 112 MMHG | BODY MASS INDEX: 32 KG/M2 | HEART RATE: 79 BPM | DIASTOLIC BLOOD PRESSURE: 77 MMHG | WEIGHT: 171.19 LBS

## 2023-12-18 DIAGNOSIS — Z30.013 ENCOUNTER FOR INITIAL PRESCRIPTION OF INJECTABLE CONTRACEPTIVE: ICD-10-CM

## 2023-12-18 PROBLEM — Z37.9 NORMAL LABOR (HCC): Status: RESOLVED | Noted: 2023-11-05 | Resolved: 2023-12-18

## 2023-12-18 PROBLEM — Z28.39 MATERNAL VARICELLA, NON-IMMUNE: Status: RESOLVED | Noted: 2023-04-24 | Resolved: 2023-12-18

## 2023-12-18 PROBLEM — O09.899 MATERNAL VARICELLA, NON-IMMUNE (HCC): Status: RESOLVED | Noted: 2023-04-24 | Resolved: 2023-12-18

## 2023-12-18 PROBLEM — Z37.9 NORMAL LABOR: Status: RESOLVED | Noted: 2023-11-05 | Resolved: 2023-12-18

## 2023-12-18 PROBLEM — O99.210 OBESITY AFFECTING PREGNANCY, ANTEPARTUM (HCC): Status: RESOLVED | Noted: 2023-04-24 | Resolved: 2023-12-18

## 2023-12-18 PROBLEM — O09.899 MATERNAL VARICELLA, NON-IMMUNE: Status: RESOLVED | Noted: 2023-04-24 | Resolved: 2023-12-18

## 2023-12-18 PROBLEM — Z13.79 GENETIC TESTING: Status: RESOLVED | Noted: 2023-04-24 | Resolved: 2023-12-18

## 2023-12-18 PROBLEM — Z91.199 NONCOMPLIANCE: Status: RESOLVED | Noted: 2023-10-25 | Resolved: 2023-12-18

## 2023-12-18 PROBLEM — Z28.39 MATERNAL VARICELLA, NON-IMMUNE (HCC): Status: RESOLVED | Noted: 2023-04-24 | Resolved: 2023-12-18

## 2023-12-18 PROBLEM — O99.012 ANEMIA DURING PREGNANCY IN SECOND TRIMESTER: Status: RESOLVED | Noted: 2023-07-18 | Resolved: 2023-12-18

## 2023-12-18 PROBLEM — Z82.79 FAMILY HISTORY OF CONGENITAL HEART DISEASE: Status: RESOLVED | Noted: 2023-04-24 | Resolved: 2023-12-18

## 2023-12-18 PROBLEM — O99.012 ANEMIA DURING PREGNANCY IN SECOND TRIMESTER (HCC): Status: RESOLVED | Noted: 2023-07-18 | Resolved: 2023-12-18

## 2023-12-18 PROBLEM — O99.210 OBESITY AFFECTING PREGNANCY, ANTEPARTUM: Status: RESOLVED | Noted: 2023-04-24 | Resolved: 2023-12-18

## 2023-12-18 LAB
CONTROL LINE PRESENT WITH A CLEAR BACKGROUND (YES/NO): YES YES/NO
KIT LOT #: NORMAL NUMERIC
PREGNANCY TEST, URINE: NEGATIVE

## 2023-12-18 PROCEDURE — 96372 THER/PROPH/DIAG INJ SC/IM: CPT | Performed by: OBSTETRICS & GYNECOLOGY

## 2023-12-18 PROCEDURE — 81025 URINE PREGNANCY TEST: CPT | Performed by: OBSTETRICS & GYNECOLOGY

## 2023-12-18 PROCEDURE — 3078F DIAST BP <80 MM HG: CPT | Performed by: OBSTETRICS & GYNECOLOGY

## 2023-12-18 PROCEDURE — 3074F SYST BP LT 130 MM HG: CPT | Performed by: OBSTETRICS & GYNECOLOGY

## 2023-12-18 RX ORDER — MEDROXYPROGESTERONE ACETATE 150 MG/ML
150 INJECTION, SUSPENSION INTRAMUSCULAR
Status: SHIPPED | OUTPATIENT
Start: 2023-12-18 | End: 2024-06-15

## 2023-12-18 RX ADMIN — MEDROXYPROGESTERONE ACETATE 150 MG: 150 INJECTION, SUSPENSION INTRAMUSCULAR at 11:09:00

## 2023-12-18 NOTE — PROGRESS NOTES
DAMARI Messina is a 26year old female  here for 6 week post-partum visit. Patient delivered a  male infant on 23- [address] Patient desires Depo-provera. for contraception. Patient is breast & bottle feeding planned 6 months. Patient denies symptoms of depression, [place]  depression scale score of 3 out of 30. OBSTETRIC HISTORY  OB History    Para Term  AB Living   1 1 1     1   SAB IAB Ectopic Multiple Live Births         0 1      # Outcome Date GA Lbr Aquiles/2nd Weight Sex Delivery Anes PTL Lv   1 Term 23 39w2d 14:36 / 00:18 7 lb 11.5 oz (3.5 kg) M NORMAL SPONT Pud N TONE      Complications: Variable decelerations       MEDICATIONS:    Current Outpatient Medications:     Ferrous Gluconate 324 (38 Fe) MG Oral Tab, Take 1 tablet (325 mg total) by mouth daily with breakfast., Disp: , Rfl:     Prenatal Vit-Fe Sulfate-FA (PRENATAL VITAMIN OR), Take by mouth. (Patient not taking: Reported on 2023), Disp: , Rfl:     ALLERGIES:  No Known Allergies    PHYSICAL EXAM  Blood pressure 112/77, pulse 79, weight 171 lb 3.2 oz (77.7 kg), last menstrual period 2023, currently breastfeeding. General:  Well nourished, well developed woman in no acute distress  Abdomen:  soft, nontender, no masses  External Genitalia: normal appearance, hair distribution, and no lesions  Vagina:  Normal appearance without lesions, no abnormal discharge  Cervix:  Normal without tenderness on motion  Uterus: normal in size, contour, position, mobility, without tenderness  Adnexa: normal without masses or tenderness  Perineum: well healed perineum  Anus: no hemorroids       ASSESSMENT/PLAN  Discussed all options of birthcontrol including ocps, minipill, Mirena or Paragard IUD, nuvaring, orthoevra patch, nexplanon, Depoprovera, condoms or tubal sterilization options. Patient has chosen Depo-provera. Had IC since delivery so UCG done prior to 1st dose.  Annual due in April  Patient to return for annual gyne exam in April.

## 2023-12-18 NOTE — PROGRESS NOTES
Depo Provera injection given IM in Right Upper Outer Gluteus after verbal consent and negative UPT obtained. Pt tolerated well. Reminded pt to return March 5 - March 19 next injection.

## 2023-12-19 ENCOUNTER — TELEPHONE (OUTPATIENT)
Dept: OBGYN UNIT | Facility: HOSPITAL | Age: 23
End: 2023-12-19

## 2024-01-19 ENCOUNTER — APPOINTMENT (OUTPATIENT)
Dept: ULTRASOUND IMAGING | Facility: HOSPITAL | Age: 24
End: 2024-01-19
Attending: STUDENT IN AN ORGANIZED HEALTH CARE EDUCATION/TRAINING PROGRAM
Payer: MEDICAID

## 2024-01-19 ENCOUNTER — HOSPITAL ENCOUNTER (EMERGENCY)
Facility: HOSPITAL | Age: 24
Discharge: HOME | End: 2024-01-19
Attending: STUDENT IN AN ORGANIZED HEALTH CARE EDUCATION/TRAINING PROGRAM
Payer: MEDICAID

## 2024-01-19 VITALS
DIASTOLIC BLOOD PRESSURE: 78 MMHG | HEART RATE: 78 BPM | OXYGEN SATURATION: 100 % | SYSTOLIC BLOOD PRESSURE: 125 MMHG | TEMPERATURE: 98 F | BODY MASS INDEX: 32.1 KG/M2 | HEIGHT: 61 IN | RESPIRATION RATE: 20 BRPM | WEIGHT: 170 LBS

## 2024-01-19 DIAGNOSIS — N94.6 MENSTRUAL CRAMPS: Primary | ICD-10-CM

## 2024-01-19 LAB
ALBUMIN SERPL-MCNC: 4.7 G/DL (ref 3.2–4.8)
ALBUMIN/GLOB SERPL: 1.7 {RATIO} (ref 1–2)
ALP LIVER SERPL-CCNC: 97 U/L
ALT SERPL-CCNC: 57 U/L
ANION GAP SERPL CALC-SCNC: 8 MMOL/L (ref 0–18)
AST SERPL-CCNC: 35 U/L (ref ?–34)
B-HCG UR QL: NEGATIVE
BASOPHILS # BLD AUTO: 0.04 X10(3) UL (ref 0–0.2)
BASOPHILS NFR BLD AUTO: 0.8 %
BILIRUB SERPL-MCNC: 0.2 MG/DL (ref 0.3–1.2)
BILIRUB UR QL: NEGATIVE
BUN BLD-MCNC: 9 MG/DL (ref 9–23)
BUN/CREAT SERPL: 12.5 (ref 10–20)
CALCIUM BLD-MCNC: 9.2 MG/DL (ref 8.7–10.4)
CHLORIDE SERPL-SCNC: 114 MMOL/L (ref 98–112)
CLARITY UR: CLEAR
CO2 SERPL-SCNC: 20 MMOL/L (ref 21–32)
COLOR UR: YELLOW
CREAT BLD-MCNC: 0.72 MG/DL
DEPRECATED RDW RBC AUTO: 50 FL (ref 35.1–46.3)
EGFRCR SERPLBLD CKD-EPI 2021: 122 ML/MIN/1.73M2 (ref 60–?)
EOSINOPHIL # BLD AUTO: 0.09 X10(3) UL (ref 0–0.7)
EOSINOPHIL NFR BLD AUTO: 1.8 %
ERYTHROCYTE [DISTWIDTH] IN BLOOD BY AUTOMATED COUNT: 18.6 % (ref 11–15)
GLOBULIN PLAS-MCNC: 2.8 G/DL (ref 2.8–4.4)
GLUCOSE BLD-MCNC: 100 MG/DL (ref 70–99)
GLUCOSE UR-MCNC: NORMAL MG/DL
HCT VFR BLD AUTO: 38.4 %
HGB BLD-MCNC: 12.3 G/DL
IMM GRANULOCYTES # BLD AUTO: 0.01 X10(3) UL (ref 0–1)
IMM GRANULOCYTES NFR BLD: 0.2 %
KETONES UR-MCNC: NEGATIVE MG/DL
LEUKOCYTE ESTERASE UR QL STRIP.AUTO: NEGATIVE
LYMPHOCYTES # BLD AUTO: 1.45 X10(3) UL (ref 1–4)
LYMPHOCYTES NFR BLD AUTO: 28.5 %
MCH RBC QN AUTO: 23.8 PG (ref 26–34)
MCHC RBC AUTO-ENTMCNC: 32 G/DL (ref 31–37)
MCV RBC AUTO: 74.4 FL
MONOCYTES # BLD AUTO: 0.44 X10(3) UL (ref 0.1–1)
MONOCYTES NFR BLD AUTO: 8.7 %
NEUTROPHILS # BLD AUTO: 3.05 X10 (3) UL (ref 1.5–7.7)
NEUTROPHILS # BLD AUTO: 3.05 X10(3) UL (ref 1.5–7.7)
NEUTROPHILS NFR BLD AUTO: 60 %
NITRITE UR QL STRIP.AUTO: NEGATIVE
OSMOLALITY SERPL CALC.SUM OF ELEC: 293 MOSM/KG (ref 275–295)
PH UR: 5.5 [PH] (ref 5–8)
PLATELET # BLD AUTO: 419 10(3)UL (ref 150–450)
POTASSIUM SERPL-SCNC: 4.2 MMOL/L (ref 3.5–5.1)
PROT SERPL-MCNC: 7.5 G/DL (ref 5.7–8.2)
RBC # BLD AUTO: 5.16 X10(6)UL
RBC #/AREA URNS AUTO: >10 /HPF
SODIUM SERPL-SCNC: 142 MMOL/L (ref 136–145)
SP GR UR STRIP: 1.03 (ref 1–1.03)
UROBILINOGEN UR STRIP-ACNC: NORMAL
WBC # BLD AUTO: 5.1 X10(3) UL (ref 4–11)

## 2024-01-19 PROCEDURE — 93975 VASCULAR STUDY: CPT | Performed by: STUDENT IN AN ORGANIZED HEALTH CARE EDUCATION/TRAINING PROGRAM

## 2024-01-19 PROCEDURE — 99284 EMERGENCY DEPT VISIT MOD MDM: CPT

## 2024-01-19 PROCEDURE — 80053 COMPREHEN METABOLIC PANEL: CPT | Performed by: STUDENT IN AN ORGANIZED HEALTH CARE EDUCATION/TRAINING PROGRAM

## 2024-01-19 PROCEDURE — 81025 URINE PREGNANCY TEST: CPT

## 2024-01-19 PROCEDURE — 76856 US EXAM PELVIC COMPLETE: CPT | Performed by: STUDENT IN AN ORGANIZED HEALTH CARE EDUCATION/TRAINING PROGRAM

## 2024-01-19 PROCEDURE — 85025 COMPLETE CBC W/AUTO DIFF WBC: CPT | Performed by: STUDENT IN AN ORGANIZED HEALTH CARE EDUCATION/TRAINING PROGRAM

## 2024-01-19 PROCEDURE — 80053 COMPREHEN METABOLIC PANEL: CPT

## 2024-01-19 PROCEDURE — 81001 URINALYSIS AUTO W/SCOPE: CPT | Performed by: STUDENT IN AN ORGANIZED HEALTH CARE EDUCATION/TRAINING PROGRAM

## 2024-01-19 PROCEDURE — 85025 COMPLETE CBC W/AUTO DIFF WBC: CPT

## 2024-01-19 PROCEDURE — 96374 THER/PROPH/DIAG INJ IV PUSH: CPT

## 2024-01-19 PROCEDURE — 76830 TRANSVAGINAL US NON-OB: CPT | Performed by: STUDENT IN AN ORGANIZED HEALTH CARE EDUCATION/TRAINING PROGRAM

## 2024-01-19 RX ORDER — KETOROLAC TROMETHAMINE 15 MG/ML
15 INJECTION, SOLUTION INTRAMUSCULAR; INTRAVENOUS ONCE
Status: COMPLETED | OUTPATIENT
Start: 2024-01-19 | End: 2024-01-19

## 2024-01-19 NOTE — ED INITIAL ASSESSMENT (HPI)
Lower abdominal pain, started menstrual cycle yesterday.   Denies previous abd surgeries.  No vomiting diarrhea no fever.   Started depo shot a month ago, 1st period since depo shot, and 1st postpartum period (delivered in nov)

## 2024-01-20 NOTE — ED PROVIDER NOTES
History     Chief Complaint   Patient presents with    Abdomen/Flank Pain       HPI    23 year old female presents with bilateral pelvic cramping discomfort since yesterday, intermittent, she started her menstrual cycle yesterday.  Has been on the Provera for 1 month.  No associated fevers, vomiting, bloody stools.          Past Medical History:   Diagnosis Date    Obesity (BMI 30.0-34.9)        History reviewed. No pertinent surgical history.    Social History     Socioeconomic History    Marital status:    Tobacco Use    Smoking status: Never    Smokeless tobacco: Never   Vaping Use    Vaping Use: Never used   Substance and Sexual Activity    Alcohol use: Not Currently    Drug use: Never     Social Determinants of Health     Financial Resource Strain: Low Risk  (11/5/2023)    Financial Resource Strain     Difficulty of Paying Living Expenses: Not hard at all     Med Affordability: No   Food Insecurity: No Food Insecurity (11/5/2023)    Food Insecurity     Food Insecurity: Never true   Transportation Needs: No Transportation Needs (11/5/2023)    Transportation Needs     Lack of Transportation: No   Stress: No Stress Concern Present (11/5/2023)    Stress     Feeling of Stress : No   Housing Stability: Low Risk  (11/5/2023)    Housing Stability     Housing Instability: No                   Physical Exam     ED Triage Vitals   BP 01/19/24 1652 119/80   Pulse 01/19/24 1652 80   Resp 01/19/24 1652 20   Temp 01/19/24 1652 98.3 °F (36.8 °C)   Temp src --    SpO2 01/19/24 1652 97 %   O2 Device 01/19/24 1830 None (Room air)       Physical Exam  Constitutional:       General: She is not in acute distress.  Eyes:      Extraocular Movements: Extraocular movements intact.   Cardiovascular:      Rate and Rhythm: Normal rate.      Pulses: Normal pulses.   Pulmonary:      Effort: Pulmonary effort is normal. No respiratory distress.   Abdominal:      Tenderness: There is abdominal tenderness (Mild lower pelvic region  bilaterally). There is no guarding or rebound. Negative signs include Rovsing's sign, psoas sign and obturator sign.   Musculoskeletal:      Cervical back: Normal range of motion.   Neurological:      General: No focal deficit present.      Mental Status: She is alert.              ED Course     Labs Reviewed   COMP METABOLIC PANEL (14) - Abnormal; Notable for the following components:       Result Value    Glucose 100 (*)     Chloride 114 (*)     CO2 20.0 (*)     ALT 57 (*)     AST 35 (*)     Bilirubin, Total 0.2 (*)     All other components within normal limits   URINALYSIS, ROUTINE - Abnormal; Notable for the following components:    Blood Urine 3+ (*)     Protein Urine Trace (*)     RBC Urine >10 (*)     Squamous Epi. Cells Few (*)     All other components within normal limits   CBC W/ DIFFERENTIAL - Abnormal; Notable for the following components:    MCV 74.4 (*)     MCH 23.8 (*)     RDW-SD 50.0 (*)     RDW 18.6 (*)     All other components within normal limits   POCT PREGNANCY URINE - Normal   CBC WITH DIFFERENTIAL WITH PLATELET    Narrative:     The following orders were created for panel order CBC With Differential With Platelet.  Procedure                               Abnormality         Status                     ---------                               -----------         ------                     CBC W/ DIFFERENTIAL[[id number]]          Abnormal            Final result                 Please view results for these tests on the individual orders.     US PELVIS EV W DOPPLER (CPT=76856/93638/34889)    Result Date: 1/19/2024  CONCLUSION:  1. No ovarian torsion.  Normal ovaries. 2. Endometrial thickness of 18 mm probably reflects cyclical changes.  If patient has abnormal bleeding, follow-up ultrasound at the beginning of a cycle recommended to re-evaluate.    Dictated by (CST): Alvaro Echavarria MD on 1/19/2024 at 8:33 PM     Finalized by (CST): Alvaro Echavarria MD on 1/19/2024 at 8:37 PM               MDM      Vitals:    01/19/24 1652 01/19/24 1830 01/19/24 1945   BP: 119/80 125/78    Pulse: 80 75 78   Resp: 20     Temp: 98.3 °F (36.8 °C)     SpO2: 97% 98% 100%   Weight: 77.1 kg     Height: 154.9 cm (5' 1\")         Bilateral pelvic cramps, may be menstrual in nature, ovarian cystic disease, much less likely torsion given intermittent bilateral nature.  Patient has no focal tenderness in the region of the appendix or gallbladder to suggest surgical pathology.  Will check labs, urinalysis and ultrasound.  Toradol for pain, good response    ED Course as of 01/19/24 2045  ------------------------------------------------------------  Time: 01/19 2041  Comment: Labs with reassuring hemoglobin and renal function.  Trace elevation of LFTs - advised outpt f/u with pcp.  Pregnancy negative.  UA without evidence of infection.  Ultrasound with consistent menstrual changes, no evidence of torsion.  ------------------------------------------------------------  Time: 01/19 2043  Comment: Discussed all findings.  nsaids  Patient is feeling well to be discharged.  Vitals remain stable.  Ambulating without difficulty.  Given written and verbal instructions regarding this condition and strict return precautions.   Will follow up in clinic.   Patient verbalizes understanding of instructions and follow up plan, as well as indications to return to the emergency department.           Disposition and Plan     Clinical Impression:  1. Menstrual cramps        Disposition:  Discharge    Follow-up:  obgyn as needed, pcp    Follow up        Medications Prescribed:  Current Discharge Medication List

## 2024-04-16 ENCOUNTER — HOSPITAL ENCOUNTER (EMERGENCY)
Facility: HOSPITAL | Age: 24
Discharge: HOME | End: 2024-04-16
Attending: EMERGENCY MEDICINE
Payer: MEDICAID

## 2024-04-16 VITALS
RESPIRATION RATE: 18 BRPM | OXYGEN SATURATION: 100 % | HEART RATE: 70 BPM | DIASTOLIC BLOOD PRESSURE: 87 MMHG | TEMPERATURE: 98 F | SYSTOLIC BLOOD PRESSURE: 132 MMHG

## 2024-04-16 DIAGNOSIS — H66.92 LEFT OTITIS MEDIA, UNSPECIFIED OTITIS MEDIA TYPE: Primary | ICD-10-CM

## 2024-04-16 PROCEDURE — 99283 EMERGENCY DEPT VISIT LOW MDM: CPT

## 2024-04-16 RX ORDER — AMOXICILLIN 500 MG/1
500 TABLET, FILM COATED ORAL 3 TIMES DAILY
Qty: 21 TABLET | Refills: 0 | Status: SHIPPED | OUTPATIENT
Start: 2024-04-16 | End: 2024-04-16

## 2024-04-16 RX ORDER — AMOXICILLIN AND CLAVULANATE POTASSIUM 875; 125 MG/1; MG/1
1 TABLET, FILM COATED ORAL 2 TIMES DAILY
Qty: 14 TABLET | Refills: 0 | Status: SHIPPED | OUTPATIENT
Start: 2024-04-16 | End: 2024-04-16

## 2024-04-16 RX ORDER — AMOXICILLIN AND CLAVULANATE POTASSIUM 875; 125 MG/1; MG/1
1 TABLET, FILM COATED ORAL 2 TIMES DAILY
Qty: 20 TABLET | Refills: 0 | Status: SHIPPED | OUTPATIENT
Start: 2024-04-16 | End: 2024-04-26

## 2024-04-16 NOTE — ED INITIAL ASSESSMENT (HPI)
Patient aox3 to ed via private vehicle co of left sided ear pain radiating to left facial area x yesterday, pain 8/10

## 2024-04-18 NOTE — ED PROVIDER NOTES
Patient Seen in: [facility] Emergency Department    History     Chief Complaint   Patient presents with    Ear Pain       HPI    24-year-old female here with left ear pain for 2 days, denies any fevers or dyspnea or chest pain or dysphagia or odynophagia    History reviewed.   Past Medical History:    Obesity (BMI 30.0-34.9)       History reviewed. No past surgical history on file.      Medications :  (Not in a hospital admission)       Family History   Problem Relation Age of Onset    Cancer Neg     Diabetes Neg     Heart Disorder Neg        Smoking Status:   Social History     Socioeconomic History    Marital status:    Tobacco Use    Smoking status: Never    Smokeless tobacco: Never   Vaping Use    Vaping status: Never Used   Substance and Sexual Activity    Alcohol use: Not Currently    Drug use: Never       Constitutional and vital signs reviewed.      Social History and Family History elements reviewed from today, pertinent positives to the presenting problem noted.    Physical Exam     ED Triage Vitals [04/16/24 0810]   /87   Pulse 70   Resp 18   Temp 97.6 °F (36.4 °C)   Temp src    SpO2 100 %   O2 Device None (Room air)       All measures to prevent infection transmission during my interaction with the patient were taken. The patient was already wearing a droplet mask on my arrival to the room. Personal protective equipment was worn throughout the duration of the exam.  Handwashing was performed prior to and after the exam.  Stethoscope and any equipment used during my examination was cleaned with super sani-cloth germicidal wipes following the exam.     Physical Exam    General: NAD  Head: Normocephalic and atraumatic.  Mouth/Throat/Ears/Nose: Oropharynx is clear and moist.   Eyes: Conjunctivae and EOM are normal.  Left tympanic membrane bulging and erythematous.  Nontender tragus bilaterally.  Neck: Normal range of motion. Supple.   Cardiovascular: Normal rate, regular rhythm, normal  heart sounds.  Respiratory/Chest: Clear and equal bilaterally. Exhibits no tenderness.  Gastrointestinal: Soft, non-tender, non-distended. Bowel sounds are normal.   Musculoskeletal:No swelling or deformity.   Neurological: Alert and appropriate. No focal deficits.   Skin: Skin is warm and dry. No pallor.  Psychiatric: Has a normal mood and affect.      ED Course      Labs Reviewed - No data to display    As Interpreted by me    Imaging Results Available and Reviewed while in ED: No results found.  ED Medications Administered: Medications - No data to display      [facility]     Vitals:    04/16/24 0810   BP: 132/87   Pulse: 70   Resp: 18   Temp: 97.6 °F (36.4 °C)   SpO2: 100%     *I personally reviewed and interpreted all ED vitals.    Pulse Ox: 100%, Room air, Normal       Medical Decision Making      Differential Diagnosis/ Diagnostic Considerations: Acute otitis media, otitis externa    Complicating Factors: The patient already has does not have any pertinent problems on file. to contribute to the complexity of this ED evaluation.    I reviewed prior chart records including ED visit note from January 19, 2024.  Patient here with left uncomplicated acute otitis media.  Antibiotic prescription provided.    Discharged in stable condition.  Patient is comfortable with the plan.      Disposition and Plan     Clinical Impression:  1. Left otitis media, unspecified otitis media type        Disposition:  Discharge    Follow-up:  Erik Wang MD  [address] [address] [phone]    Schedule an appointment as soon as possible for a visit in 1 day(s)        Medications Prescribed:  Discharge Medication List as of 4/16/2024  8:54 AM        START taking these medications    Details   amoxicillin clavulanate 875-125 MG Oral Tab Take 1 tablet by mouth 2 (two) times daily for 10 days., Normal, Disp-20 tablet, R-0

## 2024-04-24 ENCOUNTER — HOSPITAL ENCOUNTER (OUTPATIENT)
Age: 24
Discharge: HOME | End: 2024-04-24
Payer: MEDICAID

## 2024-04-24 VITALS
SYSTOLIC BLOOD PRESSURE: 129 MMHG | HEART RATE: 107 BPM | RESPIRATION RATE: 18 BRPM | TEMPERATURE: 98 F | OXYGEN SATURATION: 98 % | DIASTOLIC BLOOD PRESSURE: 77 MMHG

## 2024-04-24 DIAGNOSIS — H66.92 LEFT OTITIS MEDIA, UNSPECIFIED OTITIS MEDIA TYPE: Primary | ICD-10-CM

## 2024-04-24 PROCEDURE — 99213 OFFICE O/P EST LOW 20 MIN: CPT

## 2024-04-24 RX ORDER — CEFDINIR 300 MG/1
300 CAPSULE ORAL 2 TIMES DAILY
Qty: 20 CAPSULE | Refills: 0 | Status: SHIPPED | OUTPATIENT
Start: 2024-04-24 | End: 2024-05-04

## 2024-04-25 NOTE — ED PROVIDER NOTES
Patient Seen in: [facility]      History     Chief Complaint   Patient presents with    Ear Pain     Stated Complaint: ear pain    Subjective:   Treasure is a 24-year-old female presenting to the immediate care complaining of persistent left ear pain.  Patient was in the emergency department on 4/16 and started on Augmentin for an ear infection.  Patient states that she has taken the medication as prescribed however she is still having pain in the left ear so she came in today for evaluation.  No new complaints today.  No fever.  She is otherwise feeling well.  Patient has no other complaints or concerns.          Objective:   Past Medical History:    Obesity (BMI 30.0-34.9)              History reviewed. No pertinent surgical history.             Social History     Socioeconomic History    Marital status:    Tobacco Use    Smoking status: Never    Smokeless tobacco: Never   Vaping Use    Vaping status: Never Used   Substance and Sexual Activity    Alcohol use: Not Currently    Drug use: Never     Social Determinants of Health     Financial Resource Strain: Low Risk  (11/5/2023)    Financial Resource Strain     Difficulty of Paying Living Expenses: Not hard at all     Med Affordability: No   Food Insecurity: No Food Insecurity (11/5/2023)    Food Insecurity     Food Insecurity: Never true   Transportation Needs: No Transportation Needs (11/5/2023)    Transportation Needs     Lack of Transportation: No   Stress: No Stress Concern Present (11/5/2023)    Stress     Feeling of Stress : No   Housing Stability: Low Risk  (11/5/2023)    Housing Stability     Housing Instability: No              Review of Systems    Positive for stated complaint: ear pain  Other systems are as noted in HPI.  Constitutional and vital signs reviewed.      All other systems reviewed and negative except as noted above.    Physical Exam     ED Triage Vitals [04/24/24 1917]   /77   Pulse 107   Resp 18   Temp 98.1 °F  (36.7 °C)   Temp src Temporal   SpO2 98 %   O2 Device None (Room air)       Current:/77   Pulse 107   Temp 98.1 °F (36.7 °C) (Temporal)   Resp 18   LMP 01/08/2024 (Exact Date)   SpO2 98%   Breastfeeding No         Physical Exam  Vitals and nursing note reviewed.   Constitutional:       General: She is not in acute distress.     Appearance: Normal appearance. She is not ill-appearing, toxic-appearing or diaphoretic.   HENT:      Head: Normocephalic.      Right Ear: Tympanic membrane, ear canal and external ear normal.      Left Ear: Ear canal and external ear normal. No tenderness. A middle ear effusion is present. No mastoid tenderness. Tympanic membrane is erythematous.      Ears:      Comments: No tragus tenderness, no mastoid tenderness.  Left TM has some erythema with ear effusion     Nose: Nose normal.      Mouth/Throat:      Mouth: Mucous membranes are moist.      Pharynx: Oropharynx is clear.   Eyes:      Conjunctiva/sclera: Conjunctivae normal.   Cardiovascular:      Rate and Rhythm: Normal rate and regular rhythm.      Pulses: Normal pulses.      Heart sounds: Normal heart sounds.   Pulmonary:      Effort: Pulmonary effort is normal. No respiratory distress.      Breath sounds: Normal breath sounds. No stridor. No wheezing, rhonchi or rales.   Abdominal:      General: Abdomen is flat.   Musculoskeletal:         General: Normal range of motion.      Cervical back: Normal range of motion.   Skin:     General: Skin is warm.      Capillary Refill: Capillary refill takes less than 2 seconds.   Neurological:      General: No focal deficit present.      Mental Status: She is alert and oriented to person, place, and time.   Psychiatric:         Mood and Affect: Mood normal.         Behavior: Behavior normal.         Thought Content: Thought content normal.         Judgment: Judgment normal.              ED Course   Labs Reviewed - No data to display              MDM               Medical Decision  Making  Multiple medical diagnoses were considered including but not limited to otitis media, otitis externa, otalgia.  Patient is well appearing, non-toxic and in no acute distress.  Vital signs are stable.   Patient's history and physical exam are consistent with left otitis media.  Currently on Augmentin for otitis media.  Recommended that the patient stop the Augmentin and start the prescription for cefdinir that was was sent to the pharmacy.  Recommended that if she has continued pain over the next 48 to 72 hours that she make an appointment to follow-up with an ear nose and throat specialist.  Recommended that parent continue to give ibuprofen and Tylenol for pain and fever control.  Recommended that if patient develops any respiratory distress, fever that does not improve with medications, vomiting, changes in urine output or any other concerning complaints the patient should go to the emergency department.  Otherwise recommended following up with primary care provider.  ED precautions discussed.  Patient advised to follow up with PCP in 2-3 days.  Patient agrees with this plan of care.  Patient verbalizes understanding of discharge instructions and plan of care.      Risk  OTC drugs.  Prescription drug management.        Disposition and Plan     Clinical Impression:  1. Left otitis media, unspecified otitis media type         Disposition:  Discharge  4/24/2024  7:41 pm    Follow-up:  Hugo Wolfe MD  [address]  [facility] [address]  [phone]          Saman Reyna DO  [address]  [facility] [address]  [phone]                Medications Prescribed:  Discharge Medication List as of 4/24/2024  7:42 PM        START taking these medications    Details   cefdinir 300 MG Oral Cap Take 1 capsule (300 mg total) by mouth 2 (two) times daily for 10 days., Normal, Disp-20 capsule, R-0

## 2024-04-25 NOTE — DISCHARGE INSTRUCTIONS
The patient has an ear infection in left ear.  Please stop the current prescription that you have and take the new cefdinir prescription that I sent today.  Please take ibuprofen and Tylenol for pain and fever control.  If patient develops any respiratory distress, fever that does not improve with medications, vomiting, changes in urine output or any other concerning complaints the patient should go to the emergency department.  If you do not have any relief in your pain in the next couple of days please make an appointment to follow-up with the ear nose throat specialist as discussed.

## 2024-04-25 NOTE — ED INITIAL ASSESSMENT (HPI)
Pt reports continued left ear pain. Seen in ER 4/18/24 pain- rx'd amoxicillin for ear infection.   Hasn't taken anything for pain.

## 2024-05-14 ENCOUNTER — OFFICE VISIT (OUTPATIENT)
Dept: INTERNAL MEDICINE CLINIC | Facility: CLINIC | Age: 24
End: 2024-05-14

## 2024-05-14 ENCOUNTER — LAB ENCOUNTER (OUTPATIENT)
Dept: LAB | Age: 24
End: 2024-05-14
Attending: INTERNAL MEDICINE

## 2024-05-14 VITALS
BODY MASS INDEX: 36.94 KG/M2 | WEIGHT: 195.63 LBS | HEART RATE: 67 BPM | OXYGEN SATURATION: 98 % | SYSTOLIC BLOOD PRESSURE: 114 MMHG | HEIGHT: 61 IN | DIASTOLIC BLOOD PRESSURE: 78 MMHG

## 2024-05-14 DIAGNOSIS — R10.11 RUQ DISCOMFORT: ICD-10-CM

## 2024-05-14 DIAGNOSIS — Z00.00 ANNUAL PHYSICAL EXAM: ICD-10-CM

## 2024-05-14 DIAGNOSIS — Z00.00 ANNUAL PHYSICAL EXAM: Primary | ICD-10-CM

## 2024-05-14 DIAGNOSIS — D17.1 LIPOMA OF ABDOMINAL WALL: ICD-10-CM

## 2024-05-14 DIAGNOSIS — Z11.8 SCREENING FOR CHLAMYDIAL DISEASE: ICD-10-CM

## 2024-05-14 DIAGNOSIS — E66.9 OBESITY, CLASS II, BMI 35-39.9: ICD-10-CM

## 2024-05-14 DIAGNOSIS — D64.9 ANEMIA, UNSPECIFIED TYPE: ICD-10-CM

## 2024-05-14 DIAGNOSIS — L30.9 ECZEMA OF BOTH HANDS: ICD-10-CM

## 2024-05-14 PROBLEM — E66.3 PEDIATRIC OVERWEIGHT: Status: ACTIVE | Noted: 2018-08-30

## 2024-05-14 LAB
ALBUMIN SERPL-MCNC: 4.3 G/DL (ref 3.2–4.8)
ALBUMIN/GLOB SERPL: 1.4 {RATIO} (ref 1–2)
ALP LIVER SERPL-CCNC: 88 U/L
ALT SERPL-CCNC: 23 U/L
ANION GAP SERPL CALC-SCNC: 8 MMOL/L (ref 0–18)
AST SERPL-CCNC: 24 U/L (ref ?–34)
BASOPHILS # BLD AUTO: 0.07 X10(3) UL (ref 0–0.2)
BASOPHILS NFR BLD AUTO: 1 %
BILIRUB SERPL-MCNC: 0.3 MG/DL (ref 0.3–1.2)
BUN BLD-MCNC: 6 MG/DL (ref 9–23)
BUN/CREAT SERPL: 10.9 (ref 10–20)
CALCIUM BLD-MCNC: 9.3 MG/DL (ref 8.7–10.4)
CHLORIDE SERPL-SCNC: 104 MMOL/L (ref 98–112)
CHOLEST SERPL-MCNC: 187 MG/DL (ref ?–200)
CO2 SERPL-SCNC: 25 MMOL/L (ref 21–32)
CREAT BLD-MCNC: 0.55 MG/DL
DEPRECATED RDW RBC AUTO: 44.8 FL (ref 35.1–46.3)
EGFRCR SERPLBLD CKD-EPI 2021: 133 ML/MIN/1.73M2 (ref 60–?)
EOSINOPHIL # BLD AUTO: 0.27 X10(3) UL (ref 0–0.7)
EOSINOPHIL NFR BLD AUTO: 3.8 %
ERYTHROCYTE [DISTWIDTH] IN BLOOD BY AUTOMATED COUNT: 16.7 % (ref 11–15)
FASTING PATIENT LIPID ANSWER: YES
FASTING STATUS PATIENT QL REPORTED: YES
GLOBULIN PLAS-MCNC: 3 G/DL (ref 2–3.5)
GLUCOSE BLD-MCNC: 81 MG/DL (ref 70–99)
HCT VFR BLD AUTO: 38 %
HDLC SERPL-MCNC: 52 MG/DL (ref 40–59)
HGB BLD-MCNC: 11.6 G/DL
IMM GRANULOCYTES # BLD AUTO: 0.02 X10(3) UL (ref 0–1)
IMM GRANULOCYTES NFR BLD: 0.3 %
LDLC SERPL CALC-MCNC: 95 MG/DL (ref ?–100)
LYMPHOCYTES # BLD AUTO: 2.74 X10(3) UL (ref 1–4)
LYMPHOCYTES NFR BLD AUTO: 38.4 %
MCH RBC QN AUTO: 22.8 PG (ref 26–34)
MCHC RBC AUTO-ENTMCNC: 30.5 G/DL (ref 31–37)
MCV RBC AUTO: 74.7 FL
MONOCYTES # BLD AUTO: 0.39 X10(3) UL (ref 0.1–1)
MONOCYTES NFR BLD AUTO: 5.5 %
NEUTROPHILS # BLD AUTO: 3.64 X10 (3) UL (ref 1.5–7.7)
NEUTROPHILS # BLD AUTO: 3.64 X10(3) UL (ref 1.5–7.7)
NEUTROPHILS NFR BLD AUTO: 51 %
NONHDLC SERPL-MCNC: 135 MG/DL (ref ?–130)
OSMOLALITY SERPL CALC.SUM OF ELEC: 281 MOSM/KG (ref 275–295)
PLATELET # BLD AUTO: 506 10(3)UL (ref 150–450)
POTASSIUM SERPL-SCNC: 4.6 MMOL/L (ref 3.5–5.1)
PROT SERPL-MCNC: 7.3 G/DL (ref 5.7–8.2)
RBC # BLD AUTO: 5.09 X10(6)UL
SODIUM SERPL-SCNC: 137 MMOL/L (ref 136–145)
TRIGL SERPL-MCNC: 239 MG/DL (ref 30–149)
TSI SER-ACNC: 2.21 MIU/ML (ref 0.55–4.78)
VLDLC SERPL CALC-MCNC: 39 MG/DL (ref 0–30)
WBC # BLD AUTO: 7.1 X10(3) UL (ref 4–11)

## 2024-05-14 PROCEDURE — 87491 CHLMYD TRACH DNA AMP PROBE: CPT

## 2024-05-14 PROCEDURE — 99204 OFFICE O/P NEW MOD 45 MIN: CPT | Performed by: INTERNAL MEDICINE

## 2024-05-14 PROCEDURE — 87591 N.GONORRHOEAE DNA AMP PROB: CPT

## 2024-05-14 PROCEDURE — 84443 ASSAY THYROID STIM HORMONE: CPT

## 2024-05-14 PROCEDURE — 84466 ASSAY OF TRANSFERRIN: CPT

## 2024-05-14 PROCEDURE — 80061 LIPID PANEL: CPT

## 2024-05-14 PROCEDURE — 83540 ASSAY OF IRON: CPT

## 2024-05-14 PROCEDURE — 85025 COMPLETE CBC W/AUTO DIFF WBC: CPT

## 2024-05-14 PROCEDURE — 36415 COLL VENOUS BLD VENIPUNCTURE: CPT

## 2024-05-14 PROCEDURE — 80053 COMPREHEN METABOLIC PANEL: CPT

## 2024-05-14 PROCEDURE — 99385 PREV VISIT NEW AGE 18-39: CPT | Performed by: INTERNAL MEDICINE

## 2024-05-14 PROCEDURE — 82728 ASSAY OF FERRITIN: CPT

## 2024-05-14 RX ORDER — TRIAMCINOLONE ACETONIDE 1 MG/G
CREAM TOPICAL 2 TIMES DAILY PRN
Qty: 453.6 G | Refills: 0 | Status: SHIPPED | OUTPATIENT
Start: 2024-05-14

## 2024-05-14 NOTE — PROGRESS NOTES
Treasure Acevedo is a 24 year old female who is here for  1. Annual physical exam    2. Screening for chlamydial disease    3. Obesity, Class II, BMI 35-39.9           HPI:   Treasure Acevedo is a 24 year old female  presents for annual physical and discuss skin issue,    Past Medical History:    Obesity (BMI 30.0-34.9)     History reviewed. No pertinent surgical history.    Current Outpatient Medications:     triamcinolone 0.1 % External Cream, Apply topically 2 (two) times daily as needed., Disp: 453.6 g, Rfl: 0    Ferrous Gluconate 324 (38 Fe) MG Oral Tab, Take 1 tablet (325 mg total) by mouth daily with breakfast., Disp: , Rfl:     Allergies:No Known Allergies  Social History     Socioeconomic History    Marital status:      Spouse name: Not on file    Number of children: Not on file    Years of education: Not on file    Highest education level: Not on file   Occupational History    Not on file   Tobacco Use    Smoking status: Never    Smokeless tobacco: Never   Vaping Use    Vaping status: Never Used   Substance and Sexual Activity    Alcohol use: Not Currently    Drug use: Never    Sexual activity: Not on file   Other Topics Concern    Not on file   Social History Narrative    Not on file     Social Determinants of Health     Financial Resource Strain: Low Risk  (11/5/2023)    Financial Resource Strain     Difficulty of Paying Living Expenses: Not hard at all     Med Affordability: No   Food Insecurity: No Food Insecurity (11/5/2023)    Food Insecurity     Food Insecurity: Never true   Transportation Needs: No Transportation Needs (11/5/2023)    Transportation Needs     Lack of Transportation: No   Stress: No Stress Concern Present (11/5/2023)    Stress     Feeling of Stress : No   Housing Stability: Low Risk  (11/5/2023)    Housing Stability     Housing Instability: No     Housing Instability Emergency: Not on file     Crib or [place]in[place]: Not on file       REVIEW OF SYSTEMS:     GENERAL HEALTH: No fevers,  chills, sweats, fatigue  VISION: No recent vision problems, blurry vision or double vision  HEENT: No decreased hearing ear pain nasal congestion or sore throat  SKIN: denies any unusual skin lesions or rashes  RESPIRATORY: denies shortness of breath, cough, wheezing  CARDIOVASCULAR: denies chest pain on exertion, palpitations, swelling in feet  GI: abdominal wall lipoma, denies abdominal pain and denies heartburn, nausea or vomiting  : No Pain on urination, change in the color of urine, discharge, urinating frequently  MUS: No back pain, joint pain, muscle pain  NEURO: denies headaches , anxiety, depression    EXAM:     Vitals:    05/14/24 0752   BP: 114/78   Pulse: 67   SpO2: 98%   Weight: 195 lb 9.6 oz (88.7 kg)   Height: 5' 1\" (1.549 m)     GENERAL: well developed, well nourished,in no apparent distress  SKIN: no rashes,no suspicious lesions  HEENT: atraumatic, normocephalic,ears and throat are clear,   NECK: supple,no adenopathy,  LUNGS: clear to auscultation, no wheeze  CARDIO: RRR without murmur  GI: right upper quadrant subcutaneous mass 5x5 cm, mobile, good BS's,no masses or tenderness  EXTREMITIES: no cyanosis, or edema    ASSESSMENT AND PLAN:   1. Annual physical exam  - CBC With Differential With Platelet; Future  - Comp Metabolic Panel (14); Future  - TSH W Reflex To Free T4; Future  - Lipid Panel; Future    2. Screening for chlamydial disease  - Chlamydia/Gc Amplification [E]; Future    3. Obesity, Class II, BMI 35-39.9  -Discussed Kcal goals <1800 Kcal, carbohydrate goal <100 grams  -Exercise goal 1 hour daily  -Stress management 10 minutes meditation before bedtime  -Drink at least 64 oz water daily    4. Lipoma of abdominal wall  5. RUQ discomfort  -was told that she has a lipoma RUQ abdominal wall  -sometimes irritates her, not related to food or specific position  Plan  - US ABDOMEN LIMITED (CPT=76705); Future      6. Eczema of both hands  - triamcinolone 0.1 % External Cream; Apply topically 2  (two) times daily as needed.  Dispense: 453.6 g; Refill: 0    Health Maintenance   Topic Date Due    Chlamydia Screening  04/24/2024    COVID-19 Vaccine (5 - 2023-24 season) 01/01/2025 (Originally 9/1/2023)    Influenza Vaccine (Season Ended) 10/01/2024    Annual Physical  05/14/2025    Pap Smear  04/24/2026    DTaP,Tdap,and Td Vaccines (9 - Td or Tdap) 08/15/2033    Annual Depression Screening  Completed    HPV Vaccines  Completed    Pneumococcal Vaccine: Birth to 64yrs  Aged Out           Samantha Siu MD  5/14/2024

## 2024-05-14 NOTE — PATIENT INSTRUCTIONS
Please try to work on the following dietary changes:  Goals: Aim for 20-30 grams of protein/ meal  Aim for <100 grams of carbohydrates/day  Eat 4-6 vegetables/day  Avoid skipping meals- eat every 4-5 hours  Aim for 3 meals/day  2. Drink lots of water and cut down on soda/juice consumption if soda/juice drinker  3. Focus on protein: (15-30 grams with each meal) ie. greek yogurt, cottage cheese, string cheese, hard boiled eggs  4. Healthy snacks: always have protein in your snack! peanut butter and apples, hummus and carrots, berries, nuts (1/4 cup), tuna and crackers                 Protein Shakes: Premier protein or Core Power                Protein Bars: Rx Bars, Oatmega, Power Crunch                 [place] balanced breaks (cheese and nuts)- without chocolate  5. Reduce carbohydrates <100 grams which includes sweets as well as rice, pasta, potatoes, bread, corn and instead choose whole grain options or more protein or vegetables (4-6 servings of vegetables per day). Use [employer] sherif for carb counting!  6. Get a good night of sleep  7. Try to decrease stress in life; meditate at least 10 minutes before sleeping! Try it and let me know what works for you, try youQuick [employer] or apps like Calm and [employer]!     Please download apps:  1. \"My Fitness Pal\" (other option is Lose it)) to help you to monitor daily dietary intake and you will be able to see if you are eating the right amount of calories, protein, carbs                With My Fitness Pal-->When you set-up the sherif or need to adjust settings:                Goals should include:                 Lose 1.5-2 lbs per week                Activity level: not very active (can't count exercise towards calorie number per day)                   ** Daily INPUT> Look at nutrition section-- \"nutrients\" and it will break down your macros for the day (ie. Protein, carbs, fibers, sugars and fats). Try to stay within these numbers daily     2. \"7 minute workout\" to help with  exercise/activity which takes 7 minutes of your day and that you can do at home!   3. \"Calm\" or \"Headspace\" which helps with mindfulness, meditation, clarity, sleep, and falguni to your daily life.   4. [employer] blog for healthy recipe ideas  5. [employer] for low carb resources     HIGH PROTEIN SNACK IDEAS:  -cottage cheese  -plain yogurt  -kefir  -hard-boiled eggs  -natural cheeses  -nuts (measure portion size)   -unsweetened nut butters  -dried edamame   -dayana seeds soaked in water or almond milk  -soy nuts  -cured meats (monitor for sodium issues)   -hummus with vegetables  -bean dip with vegetables     FRUIT  Low carb fruit options   Raspberries: Half a cup (60 grams) contains 3 grams of carbs.  Blackberries: Half a cup (70 grams) contains 4 grams of carbs.  Strawberries: Half a cup (100 grams) contains 6 grams of carbs.  Blueberries: Half a cup (50 grams) contains 6 grams of carbs.  Plum: One medium-sized (80 grams) contains 6 grams of carbs.     VEGETABLES  Low carb vegetables             Understanding Carbohydrates  Goal <100g  A car needs the right type of fuel to run. And you need the right kind of food to function. To keep your energy level up, your body needs food that has carbohydrates (carbs). But carbs raise blood sugar levels higher and faster than other kinds of food. Your dietitian will work with you to figure out the amount of carbs youneed. Carbs come in 3 types: starches, sugars, and fiber.   Starches  Starches are found in grains, some vegetables, and beans. Grain products include bread, pasta, cereal, and tortillas. Starchy vegetables include potatoes, peas, corn, lima beans, yams, and squash. Kidney beans, pollock beans,black beans, garbanzo beans, and lentils also have starches.     Sugars  Sugars are found naturally in many foods. Or they can be added. Foods that contain natural sugar include fruits and fruit juices, dairy products, honey, and molasses. Added sugars are found in most  desserts, processed foods, candy, regular soda, and fruit drinks. These are very helpful to treat low blood sugar (hypoglycemia). They give you sugar quickly. Try to keep at least 15 to 20 grams of these simple sugars with you at all times. Eat or drink these if you start to havesymptoms of low blood sugar.   Fiber  Fiber comes from plant foods. Your body can't digest most fiber. Instead of raising blood sugar levels like other carbs, fiber stops blood sugar from rising too fast. Fiber is found in fruits, vegetables, whole grains, beans,peas, and many nuts.   Carb counting  Keep track of the amount of carbs you eat. This can help you keep the right balance of carbs, physical activity, and medicine. The amount of carbs you need will be different from what other people need. How much you need depends on many things. These include your health, the medicines you take, and how active you are. Your healthcare team will help you figure out the right amount of carbs for you. You may start with 45 to 60 grams of carbs per meal, depending on your case. Carb counting is a system that helps you keep track of thecarbohydrates you eat at each meal.   Carbs come from many foods. These include grains, starchy vegetables, fruit, milk, beans, and snack foods. You can either count carbohydrate grams or carbohydrate servings. When you count carbohydrate servings, 1 carbohydrateserving = 15 grams of carbohydrates.   Here are some examples of foods that have about 15 grams of carbs (1 serving of carbohydrates):   1/2 cup of canned or frozen fruit  A small piece of fresh fruit (4 ounces)  1 slice of bread  1/2 cup of oatmeal  1/3 cup of rice  4 to 6 crackers  1/2 English muffin  1/2 cup of black beans  1/4 of a large baked potato (3 ounces)  2/3 cup of plain fat-free yogurt  1 cup of soup  1/2 cup of casserole  6 chicken nuggets  2-inch-square brownie or cake without frosting  2 small cookies  1/2 cup of ice cream or sherbet  Carb  counting is easier when food labels are available. Look at the label to see how many grams of total carbs per serving the food contains. Then you can figure out how much you should eat. If your food doesn't have a nutrition label, you should be able to get an idea how many carbs there are per servingby using a book or website.   Two very important lines to look at on the label are the serving size and the total carbohydrate amount per serving. Here are some tips for using food labelsto count your carbs:   Check the serving size. The information on the label is based on that serving size. If you eat more than the listed serving size, you may have to double or triple the other information on the label.   Check the total grams of carbs.  Total carbohydrate from the label includes sugar, starch, and fiber. Be sure to use the total carbohydrate number (minus the fiber) and not sugar alone.  Know how many grams of carbs you can have.  Be familiar with the matching portion sizes.  Compare labels. Compare the labels of different products. Look at serving sizes and total carbs to find the products that work best for you.   Don't forget protein and fat. With the focus on carb counting, it might be easy to forget protein and fat in your meals. Don't forget to include sources of protein and healthy fat to balance your meals. Also watch how much salt (sodium) you eat. This is especially true if you have high blood pressure. If you have diabetes, limit the amount of sodium to less than 2,300 mg a day.    It’s also important to be consistent with the amount of carbs and time you eat when taking a fixed dose of diabetes medicine. Work with your healthcare provider or dietitian if you need more help. They can help you keep track of your carbs. They can also help you figure out how many grams of carbs youshould have.

## 2024-05-15 LAB
C TRACH DNA SPEC QL NAA+PROBE: NEGATIVE
N GONORRHOEA DNA SPEC QL NAA+PROBE: NEGATIVE

## 2024-05-16 DIAGNOSIS — D64.9 ANEMIA, UNSPECIFIED TYPE: Primary | ICD-10-CM

## 2024-05-16 LAB
DEPRECATED HBV CORE AB SER IA-ACNC: 5.5 NG/ML
IRON SATN MFR SERPL: 6 %
IRON SERPL-MCNC: 26 UG/DL
TIBC SERPL-MCNC: 438 UG/DL (ref 250–425)
TRANSFERRIN SERPL-MCNC: 294 MG/DL (ref 250–380)

## 2024-05-22 ENCOUNTER — OFFICE VISIT (OUTPATIENT)
Dept: OBGYN CLINIC | Facility: CLINIC | Age: 24
End: 2024-05-22

## 2024-05-22 VITALS — WEIGHT: 192.63 LBS | BODY MASS INDEX: 36 KG/M2

## 2024-05-22 DIAGNOSIS — N64.4 MASTODYNIA: Primary | ICD-10-CM

## 2024-05-22 PROCEDURE — 99213 OFFICE O/P EST LOW 20 MIN: CPT | Performed by: OBSTETRICS & GYNECOLOGY

## 2024-05-22 RX ORDER — NORGESTIMATE AND ETHINYL ESTRADIOL 0.25-0.035
1 KIT ORAL DAILY
Qty: 83 TABLET | Refills: 1 | Status: SHIPPED | OUTPATIENT
Start: 2024-05-22 | End: 2025-05-22

## 2024-05-25 PROBLEM — N64.4 MASTODYNIA: Status: ACTIVE | Noted: 2024-05-25

## 2024-05-25 NOTE — PROGRESS NOTES
Subjective:   Patient ID: Treasure Acevedo is a 24 year old female.    HPI   with now regular menses and no BC. She used single dose of DMPA in December and bled 3 of 4 weeks for 3 months. Stopped lactation in December. Since last menses, she now c/o bilateral breast pain and a \"lumpy\" feeling. No persistent DC.     History/Other:   Review of Systems   Constitutional:  Negative for appetite change, fatigue and unexpected weight change.   Eyes:  Negative for visual disturbance.   Respiratory:  Negative for cough and shortness of breath.    Cardiovascular:  Negative for chest pain, palpitations and leg swelling.   Gastrointestinal:  Negative for abdominal distention, abdominal pain, blood in stool, constipation and diarrhea.   Genitourinary:  Negative for dyspareunia, dysuria, frequency, menstrual problem, pelvic pain and urgency.   Musculoskeletal:  Negative for arthralgias and myalgias.   Skin:  Negative for rash.   Neurological:  Negative for weakness, numbness and headaches.   Psychiatric/Behavioral:  Negative for dysphoric mood. The patient is not nervous/anxious.      Current Outpatient Medications   Medication Sig Dispense Refill    Norgestimate-Eth Estradiol (SPRINTEC 28) 0.25-35 MG-MCG Oral Tab Take 1 tablet by mouth daily. 83 tablet 1    triamcinolone 0.1 % External Cream Apply topically 2 (two) times daily as needed. 453.6 g 0     Allergies:No Known Allergies    Objective:   Physical Exam  Constitutional:       General: She is not in acute distress.     Appearance: Normal appearance. She is not ill-appearing.   Chest:      Comments: Bilateral breasts without skin / nipple changes, mass or axillary adenopathy. Mild diffuse tenderness.    Neurological:      Mental Status: She is alert.         Assessment & Plan:   1. Mastodynia        PLAN: Reassurance. Observe. Needs  annual exam in 1-2.     Meds This Visit:  Requested Prescriptions     Signed Prescriptions Disp Refills    Norgestimate-Eth Estradiol  (SPRINTEC 28) 0.25-35 MG-MCG Oral Tab 83 tablet 1     Sig: Take 1 tablet by mouth daily.       Imaging & Referrals:  None

## 2024-07-18 ENCOUNTER — OFFICE VISIT (OUTPATIENT)
Dept: INTERNAL MEDICINE CLINIC | Facility: CLINIC | Age: 24
End: 2024-07-18
Payer: MEDICAID

## 2024-07-18 VITALS
OXYGEN SATURATION: 97 % | SYSTOLIC BLOOD PRESSURE: 104 MMHG | DIASTOLIC BLOOD PRESSURE: 70 MMHG | HEIGHT: 61 IN | WEIGHT: 199 LBS | BODY MASS INDEX: 37.57 KG/M2 | HEART RATE: 91 BPM

## 2024-07-18 DIAGNOSIS — R22.43 LOCALIZED SWELLING OF BOTH LOWER LEGS: ICD-10-CM

## 2024-07-18 DIAGNOSIS — Z34.91 FIRST TRIMESTER PREGNANCY (HCC): Primary | ICD-10-CM

## 2024-07-18 PROCEDURE — 99213 OFFICE O/P EST LOW 20 MIN: CPT | Performed by: INTERNAL MEDICINE

## 2024-07-18 NOTE — PROGRESS NOTES
Treasure Acevedo is a 24 year old female who is here for  1. First trimester pregnancy (HCC)    2. Localized swelling of both lower legs        HPI:   Treasure Acevedo is a 24 year old female presents for swelling   Patient is 6 weeks pregnant, LMP 6/10/2024  Swelling started about 3 weeks ago  No hx of blood clots  Sits an office job    Past Medical History:    Obesity (BMI 30.0-34.9)     History reviewed. No pertinent surgical history.    Current Outpatient Medications:     triamcinolone 0.1 % External Cream, Apply topically 2 (two) times daily as needed., Disp: 453.6 g, Rfl: 0    Allergies:No Known Allergies  Social History     Socioeconomic History    Marital status:      Spouse name: Not on file    Number of children: Not on file    Years of education: Not on file    Highest education level: Not on file   Occupational History    Not on file   Tobacco Use    Smoking status: Never    Smokeless tobacco: Never   Vaping Use    Vaping status: Never Used   Substance and Sexual Activity    Alcohol use: Not Currently    Drug use: Never    Sexual activity: Not on file   Other Topics Concern    Not on file   Social History Narrative    Not on file     Social Determinants of Health     Financial Resource Strain: Low Risk  (11/5/2023)    Financial Resource Strain     Difficulty of Paying Living Expenses: Not hard at all     Med Affordability: No   Food Insecurity: No Food Insecurity (11/5/2023)    Food Insecurity     Food Insecurity: Never true   Transportation Needs: No Transportation Needs (11/5/2023)    Transportation Needs     Lack of Transportation: No   Stress: No Stress Concern Present (11/5/2023)    Stress     Feeling of Stress : No   Housing Stability: Low Risk  (11/5/2023)    Housing Stability     Housing Instability: No     Housing Instability Emergency: Not on file     Crib or Bassinette: Not on file       REVIEW OF SYSTEMS:     GENERAL HEALTH: No fevers, chills, sweats, fatigue  VISION: No recent vision  problems, blurry vision or double vision  HEENT: No decreased hearing ear pain nasal congestion or sore throat  SKIN: denies any unusual skin lesions or rashes  RESPIRATORY: denies shortness of breath, cough, wheezing  CARDIOVASCULAR: denies chest pain on exertion, palpitations, swelling in feet  GI: abdominal wall lipoma, denies abdominal pain and denies heartburn, nausea or vomiting  : No Pain on urination, change in the color of urine, discharge, urinating frequently  MUS: No back pain, joint pain, muscle pain  NEURO: denies headaches , anxiety, depression    EXAM:     Vitals:    24 0852   BP: 104/70   Pulse: 91   SpO2: 97%   Weight: 199 lb (90.3 kg)   Height: 5' 1\" (1.549 m)     GENERAL: well developed, well nourished,in no apparent distress  SKIN: no rashes,no suspicious lesions  HEENT: atraumatic, normocephalic,ears and throat are clear,   NECK: supple,no adenopathy,  LUNGS: clear to auscultation, no wheeze  CARDIO: RRR without murmur  GI: right upper quadrant subcutaneous mass 5x5 cm, mobile, good BS's,no masses or tenderness  EXTREMITIES: no cyanosis, or edema    ASSESSMENT AND PLAN:     1. First trimester pregnancy (HCC)  2. Localized swelling of both lower legs  -LMP 6/10/2024, 6 weeks 2 days pregnant  - 0, 8 months postpartum  Plan:  -get up every hour to walk around  -explained progesterone effect on vasodilation and worsening LLE  -can try compression stockings  -educated on alarming signs like (redness, pain, unilateral swelling etc)    Return if symptoms worsen or fail to improve.    Samantha Siu MD

## 2024-07-31 ENCOUNTER — TELEPHONE (OUTPATIENT)
Dept: OBGYN CLINIC | Facility: CLINIC | Age: 24
End: 2024-07-31

## 2024-07-31 NOTE — TELEPHONE ENCOUNTER
Patient says she is pregnant again and would like to know what time frame she needs to schedule a glucose test for. Please advise.

## 2024-07-31 NOTE — TELEPHONE ENCOUNTER
Called patient x2, received busy signal. Was unable to leave message. Message sent to patient via [employer].

## 2024-08-01 ENCOUNTER — LAB ENCOUNTER (OUTPATIENT)
Dept: LAB | Facility: HOSPITAL | Age: 24
End: 2024-08-01
Attending: NURSE PRACTITIONER
Payer: MEDICAID

## 2024-08-01 ENCOUNTER — OFFICE VISIT (OUTPATIENT)
Dept: OBGYN CLINIC | Facility: CLINIC | Age: 24
End: 2024-08-01
Payer: MEDICAID

## 2024-08-01 VITALS
DIASTOLIC BLOOD PRESSURE: 77 MMHG | BODY MASS INDEX: 37 KG/M2 | HEART RATE: 88 BPM | WEIGHT: 195.38 LBS | SYSTOLIC BLOOD PRESSURE: 114 MMHG

## 2024-08-01 DIAGNOSIS — N92.6 IRREGULAR PERIODS: ICD-10-CM

## 2024-08-01 DIAGNOSIS — Z32.01 PREGNANCY EXAMINATION OR TEST, POSITIVE RESULT (HCC): ICD-10-CM

## 2024-08-01 DIAGNOSIS — Z32.01 PREGNANCY EXAMINATION OR TEST, POSITIVE RESULT (HCC): Primary | ICD-10-CM

## 2024-08-01 LAB
B-HCG SERPL-ACNC: ABNORMAL MIU/ML
CONTROL LINE PRESENT WITH A CLEAR BACKGROUND (YES/NO): YES YES/NO
KIT LOT #: NORMAL NUMERIC
PREGNANCY TEST, URINE: POSITIVE

## 2024-08-01 PROCEDURE — 84702 CHORIONIC GONADOTROPIN TEST: CPT

## 2024-08-01 PROCEDURE — 81025 URINE PREGNANCY TEST: CPT | Performed by: NURSE PRACTITIONER

## 2024-08-01 PROCEDURE — 99213 OFFICE O/P EST LOW 20 MIN: CPT | Performed by: NURSE PRACTITIONER

## 2024-08-01 PROCEDURE — 36415 COLL VENOUS BLD VENIPUNCTURE: CPT

## 2024-08-01 RX ORDER — DIPHENHYDRAMINE HYDROCHLORIDE 25 MG/1
25 CAPSULE ORAL 3 TIMES DAILY
Qty: 90 TABLET | Refills: 3 | Status: SHIPPED | OUTPATIENT
Start: 2024-08-01

## 2024-08-01 RX ORDER — DOXYLAMINE SUCCINATE 25 MG/1
12.5 TABLET ORAL NIGHTLY PRN
Qty: 30 TABLET | Refills: 1 | Status: SHIPPED | OUTPATIENT
Start: 2024-08-01

## 2024-08-01 NOTE — PROGRESS NOTES
[facility]   Obstetrics and Gynecology    Treasure Acevedo is a 24 year old female  Patient's last menstrual period was 06/10/2024 (exact date).   Chief Complaint   Patient presents with    Gyn Problem     PREGNANT, IRREGULAR PERIODS   .   Last seen in 2024 with Dr. Zhao.  +home pregnancy test on . Prior home pregnancy test on . Having persistent nausea, occasional vomiting. Drinking water helps avoid vomiting.  No pelvic pain or bleeding.  Sure last menstrual period 6/10/2024. She reports periods since her delivery in 2023 had aub, where she would have bleeding for 3-4 weeks at a time.  Never took ocps precribed in May. Period in  was only 7 days.    Last delivery in 2023    Pap:2023 ASCUS, human papillomavirus+; needs repeat pap      OBSTETRICS HISTORY:  OB History    Para Term  AB Living   2 1 1 0 0 1   SAB IAB Ectopic Multiple Live Births   0 0 0 0 1       GYNE HISTORY:  Menarche: 10 (2024  6:10 PM)  Period Duration (Days): Pt has been bleeding for most of the months - has a week were period stops and then starts after (2024  6:10 PM)  Period Flow: Varies (2024  6:10 PM)  Use of Birth Control (if yes, specify type): None (2024  6:10 PM)  Pap Date: 23 (2024  6:10 PM)  Pap Result Notes: ASCUS/HPV + (2024  6:10 PM)  Follow Up Recommendation: NPN 23 EMB (2024  6:10 PM)      History   Sexual Activity    Sexual activity: Not on file       MEDICAL HISTORY:  Past Medical History:    Obesity (BMI 30.0-34.9)       SOCIAL HISTORY:  Social History     Socioeconomic History    Marital status:      Spouse name: Not on file    Number of children: Not on file    Years of education: Not on file    Highest education level: Not on file   Occupational History    Not on file   Tobacco Use    Smoking status: Never    Smokeless tobacco: Never   Vaping Use    Vaping status: Never Used   Substance and Sexual Activity    Alcohol use: Not  Currently    Drug use: Never    Sexual activity: Not on file   Other Topics Concern    Not on file   Social History Narrative    Not on file     Social Determinants of Health     Financial Resource Strain: Low Risk  (11/5/2023)    Financial Resource Strain     Difficulty of Paying Living Expenses: Not hard at all     Med Affordability: No   Food Insecurity: No Food Insecurity (11/5/2023)    Food Insecurity     Food Insecurity: Never true   Transportation Needs: No Transportation Needs (11/5/2023)    Transportation Needs     Lack of Transportation: No   Stress: No Stress Concern Present (11/5/2023)    Stress     Feeling of Stress : No   Housing Stability: Low Risk  (11/5/2023)    Housing Stability     Housing Instability: No     Housing Instability Emergency: Not on file     Crib or Bassinette: Not on file       MEDICATIONS:    Current Outpatient Medications:     Pyridoxine HCl (VITAMIN B-6) 25 MG Oral Tab, Take 1 tablet (25 mg total) by mouth in the morning, at noon, and at bedtime., Disp: 90 tablet, Rfl: 3    Doxylamine Succinate, Sleep, (UNISOM SLEEPTABS) 25 MG Oral Tab, Take 12.5 mg by mouth nightly as needed for Sleep., Disp: 30 tablet, Rfl: 1    triamcinolone 0.1 % External Cream, Apply topically 2 (two) times daily as needed., Disp: 453.6 g, Rfl: 0    ALLERGIES:  No Known Allergies      Review of Systems:  Constitutional:  Denies fatigue, night sweats, hot flashes  Cardiovascular:  denies chest pain or palpitations  Respiratory:  denies shortness of breath  Gastrointestinal:  denies heartburn, abdominal pain, diarrhea or constipation  Genitourinary:  denies dysuria, incontinence, abnormal vaginal discharge, vaginal itching   Musculoskeletal:  denies back pain.  Skin/Breast:  Denies any breast pain, lumps, or discharge.   Neurological:  denies headaches, extremity weakness or numbness.  Psychiatric: denies depression or anxiety.  Endocrine:   denies excessive thirst or urination.  Heme/Lymph:  denies history of  anemia, easy bruising or bleeding.      PHYSICAL EXAM:     Vitals:    08/01/24 1810   BP: 114/77   Pulse: 88   Weight: 195 lb 6.4 oz (88.6 kg)     Body mass index is 36.92 kg/m².     Constitutional: well developed, well nourished    Psychiatric:  Oriented to time, place, person and situation. Appropriate mood and affect      Assessment & Plan:  Treasure was seen today for gyn problem.    Diagnoses and all orders for this visit:    Pregnancy examination or test, positive result (HCC)  -     HCG, Beta Subunit (Quant Pregnancy Test); Future  -     US PREG EV/DOPPLER ONLY (CPT=76817/50619); Future  -     Pyridoxine HCl (VITAMIN B-6) 25 MG Oral Tab; Take 1 tablet (25 mg total) by mouth in the morning, at noon, and at bedtime.  -     Doxylamine Succinate, Sleep, (UNISOM SLEEPTABS) 25 MG Oral Tab; Take 12.5 mg by mouth nightly as needed for Sleep.    Irregular periods  -     HCG, Beta Subunit (Quant Pregnancy Test); Future  -     US PREG EV/DOPPLER ONLY (CPT=76817/42452); Future  -     Pyridoxine HCl (VITAMIN B-6) 25 MG Oral Tab; Take 1 tablet (25 mg total) by mouth in the morning, at noon, and at bedtime.  -     Doxylamine Succinate, Sleep, (UNISOM SLEEPTABS) 25 MG Oral Tab; Take 12.5 mg by mouth nightly as needed for Sleep.    Other orders  -     Urine Pregnancy Test    Hcg  Will plan ultrasound based on HCG but likely if +home pregnancy test on 7/6 she may be 6-8 weeks, reviewed to call if pelvic pain or bleeding  B6 vitamins and unisom rx'd  Continue prenatal vitamin  Healthy habits reviewed      NORIS Young    This note was prepared using Dragon [employer] voice recognition dictation software. As a result errors may occur. When identified these errors have been corrected. While every attempt is made to correct errors during dictation discrepancies may still exist.

## 2024-08-01 NOTE — TELEPHONE ENCOUNTER
Patient calling with +HPT.   LMP was 6/10/24. Cycles are irregular. Per patient, she often bleeds 3 weeks of the month, with 1 week off. States Dr. Zhao discussed starting Oral contraceptive, but did not start.   Scheduled for dating appointment with Myranda Chowdhury today.

## 2024-08-29 ENCOUNTER — HOSPITAL ENCOUNTER (OUTPATIENT)
Dept: ULTRASOUND IMAGING | Facility: HOSPITAL | Age: 24
Discharge: HOME | End: 2024-08-29
Attending: NURSE PRACTITIONER
Payer: MEDICAID

## 2024-08-29 ENCOUNTER — TELEPHONE (OUTPATIENT)
Dept: OBGYN CLINIC | Facility: CLINIC | Age: 24
End: 2024-08-29

## 2024-08-29 DIAGNOSIS — Z32.01 PREGNANCY EXAMINATION OR TEST, POSITIVE RESULT (HCC): ICD-10-CM

## 2024-08-29 DIAGNOSIS — N92.6 IRREGULAR PERIODS: ICD-10-CM

## 2024-08-29 PROCEDURE — 76801 OB US < 14 WKS SINGLE FETUS: CPT | Performed by: NURSE PRACTITIONER

## 2024-08-29 NOTE — TELEPHONE ENCOUNTER
Call patient to schedule new OB visit    She is 12 weeks pregnant per ultrasound today. Will use due date based on US      Study Result    Narrative   PROCEDURE: US PREG 1ST TRIMESTER (CPT=76801)     COMPARISON:   None.     INDICATIONS:   Supervision of normal pregnancy     TECHNIQUE:   Transabdominal pelvic ultrasound examination was performed.       FINDINGS:  GESTATIONAL SAC:   Normal appearing single sac in the upper uterus.  No fluid around the sac.    FETAL POLE/EMBRYO:   Present and normal appearing.    NUCHAL REGION:   No mass or abnormal fluid collection.    YOLK SAC:   Present and normal appearing.    CARDIAC ACTIVITY:   Present.  Fetal heart rate 165 BPM.    AMNIOTIC FLUID VOLUME:  Adequate  PLACENTA LOCATION:  Posterior    UTERUS:   Measures 12 x 7.5 x 9.1 cm     MENSTRUAL AGE/CASSIE:   11 weeks 3 days, CASSIE 3/17/2025  CRL:   5.86 cm.  ULTRASOUND AGE/CASSIE:   12 weeks 3 days, CASSIE 3/10/2025       OVARIES AND ADNEXA:   Normal.  No masses    RIGHT OVARY:   Measures 4.2 x 1.9 x 4.4 cm.  Normal appearance  LEFT OVARY:   Measures 2.4 x 2.2 x 1.1 cm.  Normal appearance.  CUL-DE-SAC:   Normal.  No free fluid.                 Impression   CONCLUSION:  1. Single live intrauterine gestation breech presentation measures 12 weeks 3 days.  CASSIE 3/10/2025.  2. No subchorionic hemorrhage adequate amniotic fluid.    3. Normal bilateral ovaries.  4. Recommend optimal visualization fetal structures at 20-22 weeks        Dictated by (CST): Vince Butts MD on 8/29/2024 at 3:27 PM      Finalized by (CST): Vince Butts MD on 8/29/2024 at 3:31 PM

## 2024-08-30 NOTE — TELEPHONE ENCOUNTER
Patient accepts appointment. Patient advised to start taking daily prenatal vitamin with DHA, folic acid and iron if she has not done so already. Patient agrees to see males and female providers throughout pregnancy care.

## 2024-09-06 ENCOUNTER — NURSE ONLY (OUTPATIENT)
Dept: OBGYN CLINIC | Facility: CLINIC | Age: 24
End: 2024-09-06

## 2024-09-06 DIAGNOSIS — Z34.82 ENCOUNTER FOR SUPERVISION OF OTHER NORMAL PREGNANCY IN SECOND TRIMESTER (HCC): Primary | ICD-10-CM

## 2024-09-06 RX ORDER — CHOLECALCIFEROL (VITAMIN D3) 25 MCG
1 TABLET,CHEWABLE ORAL DAILY
COMMUNITY

## 2024-09-06 RX ORDER — FERROUS SULFATE 325(65) MG
325 TABLET, DELAYED RELEASE (ENTERIC COATED) ORAL
COMMUNITY

## 2024-09-06 NOTE — PROGRESS NOTES
Pt had OBN appt today with no complaints. Normal PN labs ordered plus varicella and 1 hr gtt. Pt advised all labs must be completed and resulted prior to NPN appt. If labs are not completed and resulted the NPN appt will be cancelled. Pt informed again of both male and female providers and the need to rotate PN appt with all providers since OB on-call will be the one that delivers her. Assisted pt with scheduling NPN appt with MD.       Height: 5'1\"  Weight: 190 lb  BMI: 35.92    Pt had OB US on 8/29 and was measuring 12w3d with CASSIE of 3/10/25.      Partner's name is Bereket Dunn contact # [phone]; race:    Occupation: Dispatcher    MEDICAL HISTORY    Anemia Yes- on iron -takes them every other     Anesthetic complications No    Anxiety/Depression  No    Autoimmune Disorder No    Asthma  No    Cancer No    Diabetes  No    Gyne/breast Surgery No    Heart Disease No    Hepatitis/Liver Disease  No    History of blood transfusion No    History of abnormal pap Yes    Hypertension  No    Infertility  No    Kidney Disease/Frequent UTIs  No    Medication Allergies No    Latex Allergies No    Food Allergies  No    Neurological Disorder/Epilepsy No    Operations/Hospitalizations No    TB exposure  No    Thyroid Dysfunction No    Trauma/Violence  No    Uterine Anomaly  No    Uterine Fibroids  No    Variocosities/DVTs No    Smoker No    Drug usage in prior year No    Alcohol Rarely prior to pregnancy     Would you accept a blood transfusion? If no, are you a Voodoo?   Pt stated she DOES NOT want blood products in event of emergency             INFECTION HISTORY    Chlamydia No    Pt or partner have hx of Genital Herpes No    Gonorrhea No    Hepatitis B No    HIV No    HPV Yes    MRSA No    Syphilis No    Tattoos Yes    Live with someone or Exposed to TB No    Rash or viral illness since LMP  No    Varicella No    Pets No        GENETICS SCREENING    Genetic Screening    Genetic Screening/Teratology  Counseling- Includes patient, baby's father, or anyone in either family with:  Patient's age 35 years or older as of estimated date of delivery: No     Thalassemia (Italian, Greek, Mediterranean, or  background): MCV less than 80: No     Neural tube defect (Meningomyelocele, Spina bifida, or Anencephaly): No     Congenital heart defect: Yes (Comment: pts partners sister with hole in heart. and other sister has heart murmur)     Down syndrome: No     Sumit-Sachs (Ashkenazi Islam, Cajun, Greek [place]): No     Canavan disease (Ashkenazi Islam): No     Familial dysautonomia (Ashkenazi Islam): No     Sickle cell disease or trait (): No     Hemophilia or other blood disorders: No     Muscular dystrophy: No    Cystic fibrosis: No     [place]'s chorea: No     Intellectual disability and/or autism: Yes (Comment: partners brother & sister with autism)     If yes, was the person tested for Fragile X?: No (Comment: unknown)     Other inherited genetic or chromosomal disorder: No     Maternal metabolic disorder (eg. Type 1 diabetes, PKU): No     Patient or baby's father had child with birth defects not listed above: No     Recurrent pregnancy loss, or a stillbirth: Yes (Comment: pts mother with 3 miscarriages. partners mother with miscarriages.)     Medications (including supplements, vitamins, herbs, or OTC drugs)/illicit/recreational drugs/alcohol since last menstrual period: No                 MISC    Infant vaccinations  Yes          Pt. Has answered NO 5P questions and has NO  risk factors.    Pt. Given What pregnant women need to know handout.

## 2024-09-09 ENCOUNTER — LAB ENCOUNTER (OUTPATIENT)
Dept: LAB | Facility: HOSPITAL | Age: 24
End: 2024-09-09
Attending: OBSTETRICS & GYNECOLOGY
Payer: MEDICAID

## 2024-09-09 DIAGNOSIS — Z34.82 ENCOUNTER FOR SUPERVISION OF OTHER NORMAL PREGNANCY IN SECOND TRIMESTER (HCC): ICD-10-CM

## 2024-09-09 LAB
ANTIBODY SCREEN: NEGATIVE
BASOPHILS # BLD AUTO: 0.04 X10(3) UL (ref 0–0.2)
BASOPHILS NFR BLD AUTO: 0.5 %
DEPRECATED RDW RBC AUTO: 44.1 FL (ref 35.1–46.3)
EOSINOPHIL # BLD AUTO: 0.12 X10(3) UL (ref 0–0.7)
EOSINOPHIL NFR BLD AUTO: 1.5 %
ERYTHROCYTE [DISTWIDTH] IN BLOOD BY AUTOMATED COUNT: 17.2 % (ref 11–15)
GLUCOSE 1H P GLC SERPL-MCNC: 88 MG/DL
HBV SURFACE AG SER-ACNC: 0.15 [IU]/L
HBV SURFACE AG SERPL QL IA: NONREACTIVE
HCT VFR BLD AUTO: 34.8 %
HCV AB SERPL QL IA: NONREACTIVE
HGB BLD-MCNC: 10.7 G/DL
IMM GRANULOCYTES # BLD AUTO: 0.02 X10(3) UL (ref 0–1)
IMM GRANULOCYTES NFR BLD: 0.3 %
LYMPHOCYTES # BLD AUTO: 2.42 X10(3) UL (ref 1–4)
LYMPHOCYTES NFR BLD AUTO: 30.8 %
MCH RBC QN AUTO: 22.2 PG (ref 26–34)
MCHC RBC AUTO-ENTMCNC: 30.7 G/DL (ref 31–37)
MCV RBC AUTO: 72.3 FL
MONOCYTES # BLD AUTO: 0.53 X10(3) UL (ref 0.1–1)
MONOCYTES NFR BLD AUTO: 6.8 %
NEUTROPHILS # BLD AUTO: 4.72 X10 (3) UL (ref 1.5–7.7)
NEUTROPHILS # BLD AUTO: 4.72 X10(3) UL (ref 1.5–7.7)
NEUTROPHILS NFR BLD AUTO: 60.1 %
PLATELET # BLD AUTO: 436 10(3)UL (ref 150–450)
RBC # BLD AUTO: 4.81 X10(6)UL
RH BLOOD TYPE: POSITIVE
RUBV IGG SER QL: POSITIVE
RUBV IGG SER-ACNC: 99 IU/ML (ref 10–?)
T PALLIDUM AB SER QL IA: NONREACTIVE
WBC # BLD AUTO: 7.9 X10(3) UL (ref 4–11)

## 2024-09-09 PROCEDURE — 87389 HIV-1 AG W/HIV-1&-2 AB AG IA: CPT

## 2024-09-09 PROCEDURE — 82950 GLUCOSE TEST: CPT

## 2024-09-09 PROCEDURE — 86900 BLOOD TYPING SEROLOGIC ABO: CPT

## 2024-09-09 PROCEDURE — 86850 RBC ANTIBODY SCREEN: CPT

## 2024-09-09 PROCEDURE — 36415 COLL VENOUS BLD VENIPUNCTURE: CPT

## 2024-09-09 PROCEDURE — 87340 HEPATITIS B SURFACE AG IA: CPT

## 2024-09-09 PROCEDURE — 86803 HEPATITIS C AB TEST: CPT

## 2024-09-09 PROCEDURE — 86901 BLOOD TYPING SEROLOGIC RH(D): CPT

## 2024-09-09 PROCEDURE — 86787 VARICELLA-ZOSTER ANTIBODY: CPT

## 2024-09-09 PROCEDURE — 86780 TREPONEMA PALLIDUM: CPT

## 2024-09-09 PROCEDURE — 86762 RUBELLA ANTIBODY: CPT

## 2024-09-09 PROCEDURE — 85025 COMPLETE CBC W/AUTO DIFF WBC: CPT

## 2024-09-11 LAB — VZV IGG SER IA-ACNC: 202.3 (ref 165–?)

## 2024-09-12 ENCOUNTER — TELEPHONE (OUTPATIENT)
Dept: OBGYN CLINIC | Facility: CLINIC | Age: 24
End: 2024-09-12

## 2024-09-12 ENCOUNTER — INITIAL PRENATAL (OUTPATIENT)
Dept: OBGYN CLINIC | Facility: CLINIC | Age: 24
End: 2024-09-12
Payer: MEDICAID

## 2024-09-12 VITALS
BODY MASS INDEX: 36 KG/M2 | DIASTOLIC BLOOD PRESSURE: 73 MMHG | HEART RATE: 90 BPM | SYSTOLIC BLOOD PRESSURE: 110 MMHG | WEIGHT: 191 LBS

## 2024-09-12 DIAGNOSIS — Z34.82 ENCOUNTER FOR SUPERVISION OF OTHER NORMAL PREGNANCY IN SECOND TRIMESTER (HCC): Primary | ICD-10-CM

## 2024-09-12 DIAGNOSIS — Z13.79 GENETIC SCREENING: Primary | ICD-10-CM

## 2024-09-12 DIAGNOSIS — O99.019 ANTEPARTUM ANEMIA (HCC): ICD-10-CM

## 2024-09-12 PROBLEM — Z78.9 NO BLOOD PRODUCTS: Status: ACTIVE | Noted: 2024-09-12

## 2024-09-12 PROBLEM — E66.3 PEDIATRIC OVERWEIGHT: Status: RESOLVED | Noted: 2018-08-30 | Resolved: 2024-09-12

## 2024-09-12 LAB
APPEARANCE: CLEAR
BILIRUBIN: NEGATIVE
GLUCOSE (URINE DIPSTICK): NEGATIVE MG/DL
KETONES (URINE DIPSTICK): NEGATIVE MG/DL
LEUKOCYTES: NEGATIVE
MULTISTIX LOT#: NORMAL NUMERIC
NITRITE, URINE: NEGATIVE
OCCULT BLOOD: NEGATIVE
PH, URINE: 6.5 (ref 4.5–8)
PROTEIN (URINE DIPSTICK): NEGATIVE MG/DL
SPECIFIC GRAVITY: 1.01 (ref 1–1.03)
URINE-COLOR: YELLOW
UROBILINOGEN,SEMI-QN: 0.2 MG/DL (ref 0–1.9)

## 2024-09-12 PROCEDURE — 81002 URINALYSIS NONAUTO W/O SCOPE: CPT | Performed by: OBSTETRICS & GYNECOLOGY

## 2024-09-12 PROCEDURE — 0500F INITIAL PRENATAL CARE VISIT: CPT | Performed by: OBSTETRICS & GYNECOLOGY

## 2024-09-12 NOTE — PROGRESS NOTES
New Ob. Pap and GC/C today. Requesting genetic screening. Refusing blood products in event of emergency. Increase iron to daily, was taking every day. RTC 4 wks

## 2024-09-13 LAB
C TRACH DNA SPEC QL NAA+PROBE: NEGATIVE
N GONORRHOEA DNA SPEC QL NAA+PROBE: NEGATIVE
T VAGINALIS RRNA SPEC QL NAA+PROBE: NEGATIVE

## 2024-09-18 ENCOUNTER — HOSPITAL ENCOUNTER (EMERGENCY)
Facility: HOSPITAL | Age: 24
Discharge: HOME | End: 2024-09-18
Attending: EMERGENCY MEDICINE
Payer: MEDICAID

## 2024-09-18 ENCOUNTER — HOSPITAL ENCOUNTER (OUTPATIENT)
Dept: PERINATAL CARE | Facility: HOSPITAL | Age: 24
Discharge: HOME | End: 2024-09-18
Attending: OBSTETRICS & GYNECOLOGY
Payer: MEDICAID

## 2024-09-18 ENCOUNTER — APPOINTMENT (OUTPATIENT)
Dept: ULTRASOUND IMAGING | Facility: HOSPITAL | Age: 24
End: 2024-09-18
Attending: EMERGENCY MEDICINE
Payer: MEDICAID

## 2024-09-18 VITALS
SYSTOLIC BLOOD PRESSURE: 92 MMHG | RESPIRATION RATE: 20 BRPM | OXYGEN SATURATION: 98 % | WEIGHT: 191 LBS | BODY MASS INDEX: 36.06 KG/M2 | HEIGHT: 61 IN | HEART RATE: 81 BPM | DIASTOLIC BLOOD PRESSURE: 63 MMHG | TEMPERATURE: 98 F

## 2024-09-18 VITALS
BODY MASS INDEX: 36 KG/M2 | WEIGHT: 191 LBS | DIASTOLIC BLOOD PRESSURE: 71 MMHG | HEART RATE: 87 BPM | SYSTOLIC BLOOD PRESSURE: 110 MMHG

## 2024-09-18 DIAGNOSIS — O44.52: ICD-10-CM

## 2024-09-18 DIAGNOSIS — Z36.9 ENCOUNTER FOR ANTENATAL SCREENING OF MOTHER (HCC): Primary | ICD-10-CM

## 2024-09-18 DIAGNOSIS — Z36.9 ENCOUNTER FOR ANTENATAL SCREENING OF MOTHER (HCC): ICD-10-CM

## 2024-09-18 DIAGNOSIS — O99.210 OBESITY IN PREGNANCY (HCC): ICD-10-CM

## 2024-09-18 DIAGNOSIS — Z82.79 FAMILY HISTORY OF CONGENITAL HEART DEFECT: ICD-10-CM

## 2024-09-18 DIAGNOSIS — O20.0 THREATENED MISCARRIAGE (HCC): Primary | ICD-10-CM

## 2024-09-18 LAB
ALBUMIN SERPL-MCNC: 4.1 G/DL (ref 3.2–4.8)
ALBUMIN/GLOB SERPL: 1.5 {RATIO} (ref 1–2)
ALP LIVER SERPL-CCNC: 81 U/L
ALT SERPL-CCNC: 11 U/L
ANION GAP SERPL CALC-SCNC: 8 MMOL/L (ref 0–18)
AST SERPL-CCNC: 17 U/L (ref ?–34)
BASOPHILS # BLD AUTO: 0.04 X10(3) UL (ref 0–0.2)
BASOPHILS NFR BLD AUTO: 0.5 %
BILIRUB SERPL-MCNC: 0.3 MG/DL (ref 0.3–1.2)
BILIRUB UR QL: NEGATIVE
BUN BLD-MCNC: <5 MG/DL (ref 9–23)
CALCIUM BLD-MCNC: 9.2 MG/DL (ref 8.7–10.4)
CHLORIDE SERPL-SCNC: 109 MMOL/L (ref 98–112)
CO2 SERPL-SCNC: 21 MMOL/L (ref 21–32)
COLOR UR: YELLOW
CREAT BLD-MCNC: 0.47 MG/DL
DEPRECATED RDW RBC AUTO: 46 FL (ref 35.1–46.3)
EGFRCR SERPLBLD CKD-EPI 2021: 138 ML/MIN/1.73M2 (ref 60–?)
EOSINOPHIL # BLD AUTO: 0.11 X10(3) UL (ref 0–0.7)
EOSINOPHIL NFR BLD AUTO: 1.5 %
ERYTHROCYTE [DISTWIDTH] IN BLOOD BY AUTOMATED COUNT: 18.3 % (ref 11–15)
GLOBULIN PLAS-MCNC: 2.8 G/DL (ref 2–3.5)
GLUCOSE BLD-MCNC: 96 MG/DL (ref 70–99)
GLUCOSE UR-MCNC: NEGATIVE MG/DL
HCT VFR BLD AUTO: 35 %
HGB BLD-MCNC: 11.1 G/DL
IMM GRANULOCYTES # BLD AUTO: 0.01 X10(3) UL (ref 0–1)
IMM GRANULOCYTES NFR BLD: 0.1 %
KETONES UR-MCNC: NEGATIVE MG/DL
LYMPHOCYTES # BLD AUTO: 1.64 X10(3) UL (ref 1–4)
LYMPHOCYTES NFR BLD AUTO: 22.3 %
MCH RBC QN AUTO: 22.9 PG (ref 26–34)
MCHC RBC AUTO-ENTMCNC: 31.7 G/DL (ref 31–37)
MCV RBC AUTO: 72.2 FL
MONOCYTES # BLD AUTO: 0.37 X10(3) UL (ref 0.1–1)
MONOCYTES NFR BLD AUTO: 5 %
NEUTROPHILS # BLD AUTO: 5.2 X10 (3) UL (ref 1.5–7.7)
NEUTROPHILS # BLD AUTO: 5.2 X10(3) UL (ref 1.5–7.7)
NEUTROPHILS NFR BLD AUTO: 70.6 %
NITRITE UR QL STRIP.AUTO: NEGATIVE
PH UR: 7.5 [PH] (ref 5–8)
PLATELET # BLD AUTO: 396 10(3)UL (ref 150–450)
POTASSIUM SERPL-SCNC: 4.1 MMOL/L (ref 3.5–5.1)
PROT SERPL-MCNC: 6.9 G/DL (ref 5.7–8.2)
PROT UR-MCNC: NEGATIVE MG/DL
RBC # BLD AUTO: 4.85 X10(6)UL
SODIUM SERPL-SCNC: 138 MMOL/L (ref 136–145)
SP GR UR STRIP: 1.02 (ref 1–1.03)
UROBILINOGEN UR STRIP-ACNC: 0.2
WBC # BLD AUTO: 7.4 X10(3) UL (ref 4–11)

## 2024-09-18 PROCEDURE — 81015 MICROSCOPIC EXAM OF URINE: CPT | Performed by: EMERGENCY MEDICINE

## 2024-09-18 PROCEDURE — 76815 OB US LIMITED FETUS(S): CPT | Performed by: OBSTETRICS & GYNECOLOGY

## 2024-09-18 PROCEDURE — 36415 COLL VENOUS BLD VENIPUNCTURE: CPT

## 2024-09-18 PROCEDURE — 80053 COMPREHEN METABOLIC PANEL: CPT

## 2024-09-18 PROCEDURE — 80053 COMPREHEN METABOLIC PANEL: CPT | Performed by: EMERGENCY MEDICINE

## 2024-09-18 PROCEDURE — 99284 EMERGENCY DEPT VISIT MOD MDM: CPT

## 2024-09-18 PROCEDURE — 85025 COMPLETE CBC W/AUTO DIFF WBC: CPT | Performed by: EMERGENCY MEDICINE

## 2024-09-18 PROCEDURE — 87086 URINE CULTURE/COLONY COUNT: CPT | Performed by: EMERGENCY MEDICINE

## 2024-09-18 PROCEDURE — 85025 COMPLETE CBC W/AUTO DIFF WBC: CPT

## 2024-09-18 PROCEDURE — 99283 EMERGENCY DEPT VISIT LOW MDM: CPT

## 2024-09-18 PROCEDURE — 81001 URINALYSIS AUTO W/SCOPE: CPT | Performed by: EMERGENCY MEDICINE

## 2024-09-18 NOTE — ED QUICK NOTES
Pt verbalizes understanding of discharge paperwork including medications, follow-up care, and education. Pt a/o x4, VSS, NAD, ambulates with steady gait.      Pt to [facility] for scheduled US at 11am.

## 2024-09-18 NOTE — ADDENDUM NOTE
Encounter addended by: Kt Hess DO on: 9/18/2024 11:36 AM   Actions taken: Charge Capture section accepted

## 2024-09-18 NOTE — PROGRESS NOTES
Reason for Consult:   Dear Dr. Nuñez,    Thank you for requesting ultrasound evaluation and maternal fetal medicine consultation on Treasure Acevedo.  As you are aware she is a 24 year old female with a Olsen pregnancy at 15w2d.  A maternal-fetal medicine consultation was requested secondary to  obesity and family h/o congenital heart defect.  Her prenatal records and labs were reviewed.    She declined genetic testing.    Was in the hospital with bleeding this morning.  No active bleeding now.  No pain or cramping.  Review of History:     OB History:    OB History    Para Term  AB Living   2 1 1 0 0 1   SAB IAB Ectopic Multiple Live Births   0 0 0 0 1      # Outcome Date GA Lbr Aquiles/2nd Weight Sex Type Anes PTL Lv   2 Current            1 Term 25 39w2d 14:36 / 00:18 7 lb 11.5 oz (3.5 kg) M NORMAL SPONT Pud N TONE      Complications: Variable decelerations      Name: Paulino Dunn      Apgar1: 8  Apgar5: 9             Allergies:  No Known Allergies   Current Meds:  Current Outpatient Medications   Medication Sig Dispense Refill    prenatal vitamin with DHA 27-0.8-228 MG Oral Cap Take 1 capsule by mouth daily.      ferrous sulfate 325 (65 FE) MG Oral Tab EC Take 1 tablet (325 mg total) by mouth daily with breakfast.      Pyridoxine HCl (VITAMIN B-6) 25 MG Oral Tab Take 1 tablet (25 mg total) by mouth in the morning, at noon, and at bedtime. (Patient not taking: Reported on 2024) 90 tablet 3    Doxylamine Succinate, Sleep, (UNISOM SLEEPTABS) 25 MG Oral Tab Take 12.5 mg by mouth nightly as needed for Sleep. (Patient not taking: Reported on 2024) 30 tablet 1    triamcinolone 0.1 % External Cream Apply topically 2 (two) times daily as needed. 453.6 g 0        HISTORY:  Past Medical History:    Anemia    History of HPV infection    Obesity (BMI 30.0-34.9)    Tattoos      No past surgical history on file.   Family History   Problem Relation Age of Onset    Lipids Mother     Hypertension  Mother     Cancer Neg     Diabetes Neg     Heart Disorder Neg       Social History     Socioeconomic History    Marital status:    Tobacco Use    Smoking status: Never    Smokeless tobacco: Never   Vaping Use    Vaping status: Never Used   Substance and Sexual Activity    Alcohol use: Not Currently     Comment: rarely prior to preg    Drug use: Never     Social Determinants of Health     Financial Resource Strain: Unknown (2024)    Financial Resource Strain     Difficulty of Paying Living Expenses: Patient declined     Med Affordability: No   Food Insecurity: Unknown (2024)    Food Insecurity     Food Insecurity: Patient declined   Transportation Needs: Unknown (2024)    Transportation Needs     Lack of Transportation: Patient declined   Stress: No Stress Concern Present (2024)    Stress     Feeling of Stress : No   Housing Stability: Low Risk  (2024)    Housing Stability     Housing Instability: No        NARRATIVE:   /71   Pulse 87   Wt 191 lb (86.6 kg)   LMP 06/10/2024 (Exact Date)   BMI 36.09 kg/m²            Alert and Oriented.  No acute distress          Abdomen:  soft, nontender, no contractions noted.           extremities:  nontender, no edema           DISCUSSION  During her visit we discussed and reviewed the following issues:    OBESITY:    Obesity during pregnancy is associated with numerous maternal and  risks.  It is not clear whether obesity is a direct cause of adverse pregnancy outcome or whether the association between obesity and adverse pregnancy outcome is due to factors such as diabetes mellitus.   Data suggest that obese women should be encouraged to undertake a weight reduction program (diet, exercise, behavior modification, and possibly bariatric surgery in some cases) prior to attempting to conceive.            Subfertility in obese women is most commonly related to ovulatory dysfunction, and, in some obese women, the ovulatory dysfunction  is related to polycystic ovary syndrome (PCOS). It is also important to note that even among ovulatory women, increasing obesity is associated with decreasing spontaneous pregnancy rates.  The increased risk of miscarriage in obese women may be because such women often have PCOS or isolated insulin resistance.                 Due to its strong association with obesity in the general population, type 2 diabetes mellitus is one of the two most common medical complications of the obese . The increased risk of type 2 diabetes is primarily related to an exaggerated increase in insulin resistance in the obese state. It is reasonable to screen obese gravidas for undiagnosed pregestational diabetes in the first trimester.   Glucose intolerance associated with gestational diabetes generally resolves postpartum; however, obese women with a history of gestational diabetes have a two-fold increased prevalence of subsequent type 2 diabetes.           An association between obesity and hypertensive disorders during pregnancy has been consistently reported.  In particular, maternal weight and BMI are independent risk factors for preeclampsia.             Studies have found that the increased risk of  birth in obese gravidas is primarily associated with obesity-related medical and  complications, rather than an intrinsic predisposition to spontaneous  birth. Prevention of  birth in these patients, therefore, should be directed toward prevention or management of medical and obstetrical complications.               Both prepregnancy obesity and excessive maternal weight gain before or during pregnancy contribute to an increased probability of  delivery.  It has also been hypothesized that obesity may lead to dystocia due to increased soft tissue deposition in the maternal pelvis.    delivery in the obese  is associated with numerous perioperative concerns, including  emergency delivery, prolonged incision to delivery interval, blood loss >1000 mL, longer operative times, wound infection, thromboembolism, and endometritis.            Maternal obesity appears to be associated with a small increase in the absolute rate of some congenital anomalies, and the risk may increase with increasing maternal weight.  The risk of neural tube defects increased significantly with maternal weight.    The analysis found that overweight and obese pregnant women experienced significantly more stillbirths than normal weight women.      Increase  testing and Level 2 Ultrasound is recommended.     ------------    Congenital heart disease (CHD) is the most common congenital disorder in newborns.   Prenatal identification and management of fetal cardiac abnormalities are important because congenital anomalies are the leading cause of infant death and congenital heart disease accounts for 30 to 50 percent of these deaths.  Prenatal diagnosis of cardiac disease provides parents an opportunity to obtain prognostic information prior to birth, learn about treatment options before and after delivery, make decisions concerning the management approach that is best for their family, and plan for specific needs at birth.   Critical CHD, defined as requiring surgery or catheter-based intervention in the first year of life, occurs in approximately 25 percent of those with CHD. Although many newborns with critical CHD are symptomatic and identified soon after birth, others are not diagnosed until after discharge from the birth hospitalization. In infants with critical cardiac lesions, the risk of morbidity and mortality increases when there is a delay in diagnosis and timely referral to a tertiary center with expertise in treating these patients.     The reported prevalence of CHD at birth ranges from 6 to 13 per 1000 live births.  Offspring of women with congenital heart disease are at increased risk of  congenital heart defects. The risk of recurrent congenital heart disease varies with the specific parental defect.   The largest experience is from a series of [id number] pregnancies in which one parent or sibling had congenital heart disease. Recurrence in the fetus was detected by echocardiography 2.7 percent.    Indications for Fetal Echocardiogram --with higher risk profile (estimated >2 percent absolute risk) include:  ?Maternal pregestational diabetes mellitus   ?Maternal phenylketonuria (uncontrolled)   ?Maternal autoantibodies (SSA/SSB)  ?Maternal cardiac teratogens   ?Maternal first trimester rubella infection   ?Maternal infection with suspicion of fetal myocarditis   ?Pregnancy conceived by assisted reproduction technology (ART)   ?CHD in first degree relative of fetus   ?First or second degree relative with disorder with Mendelian inheritance with CHD association   ?Fetal cardiac abnormality suspected on obstetric ultrasound  ?Fetal noncardiac abnormality suspected on obstetric ultrasound  ?Fetal chromosome abnormality  ?Fetal tachycardia or bradycardia, or frequent or persistent irregular heart rhythm   ?Fetal increased nuchal translucency >95 percentile (>=3 mm) on first trimester sonogram   ?Monochorionic twinning   ?Fetal hydrops or effusions     Indications with lower risk profile (estimated >1 and <2 percent absolute risk) include:  ?Maternal medications (anticonvulsants, lithium, vitamin A, paroxetine, NSAIDs in first/second trimester)  ?CHD in second degree relative of fetus  ?Fetal abnormality of the umbilical cord or placenta (eg, single umbilical artery, agenesis of the ductus venosus)  ?Fetal intraabdominal venous anomaly          OB ULTRASOUND REPORT   See imaging tab for complete ultrasound report or in PACS    Single IUP in breech presentation.    Placenta is posterior, low lying.   A 3 vessel cord is noted.  Cardiac activity is present at 156 bpm  MVP is 4.1 cm .     Small subchorionic  hemorrhage noted over the cervix measuring 11 x 4 x 15 mm with low-lying placenta.             LOW LYING PLACENTA:  Rates of  delivery and antepartum bleeding decrease as the distance between the placental edge and internal os increases. There is a general consensus that there is a reasonable possibility of vaginal delivery without hemorrhage when the placenta is more than 20 mm from the internal os, so a trial of labor is appropriate if there are no other contraindications to vaginal birth. When this distance is between 1 and 20 mm, the rate of  delivery ranges from 40 to 90 percent, so management of these patients is more controversial. One of the larger retrospective studies that looked at the outcome of this specific group of pregnancies reported vaginal birth in 25 percent of the 24 women with a cervix-to-placenta distance of 1 to 10 mm and in 69 percent of 29 women with cervix-to-placenta distance of 11 to 20 mm. Although a variety of factors influenced the decision to perform  delivery, these data support allowing a trial of labor in pregnancies in which the placenta is more than 10 mm from the internal os. Postpartum hemorrhage has been reported to be increased when the placenta is less than 20mm to the os.       I discussed the risks of a subchorionic hemorrhage including pregnancy loss, PROM, chronic abruption, IUGR, prematurity, infection and maternal hemorrhage.  The potential etiologies were reviewed including thrombophilia and genetic abnormalities.         IMPRESSION:   1. IUP @  15w2d  2. Low lying placenta  3. Subchorionic hemorrhage  4. Obesity BMI 36  5.  Family history of congenital heart defects    RECOMMENDATIONS:   1.  Weekly NST at 36wks  2.  Growth US at 32wks  3. Fetal echocardiogram at 24wks  4.  Level 2 US at 20wks  5.  Early 1 hr gtt  6.  11-20 lb weight gain for pregnancy    Thank you for allowing me to participate in the care of your patient.  Please do not  hesitate to call with any questions or concerns.     Total time spent   40  minutes this calendar day which includes preparing to see the patient including chart review, obtaining and/or reviewing additional medical history, performing a physical exam and evaluation, documenting clinical information in the electronic medical record, independently interpreting results, counseling the patient, communicating results to the patient/family/caregiver and coordinating care.    Kt Hess D.O.  Maternal Fetal Medicine     Note to patient and family:  The 21st Century Cures Act makes medical notes available to patients in the interest of transparency.  However, please be advised that this is a medical document.  It is intended as a peer to peer communication.  It is written in medical language and may contain abbreviations or verbiage that are technical and unfamiliar.  It may appear blunt or direct.  Medical documents are intended to carry relevant information, facts as evident, and the clinical opinion of the practitioner.

## 2024-09-18 NOTE — ED INITIAL ASSESSMENT (HPI)
Pt reports she is 15 weeks pregnant and notice that she was bleeding this morning. Pt reports she is having cramps that stated this morning. Pt reports small amount of blood. Pt reports no fever/ N/V.

## 2024-09-18 NOTE — ED PROVIDER NOTES
Patient Seen in: [facility] Emergency Department      History     Chief Complaint   Patient presents with    Eval-G     Stated Complaint: preg issues, 15-16 weeks    Subjective:   HPI    24-year-old G1, P0 with cramping and some bleeding today due to see her MFM with an ultrasound less than an hour from now.  She had hyperemesis by description.  She denies back pain or urinary symptoms.  No fever.  Rh+.    Objective:   Past Medical History:    Anemia    History of HPV infection    Obesity (BMI 30.0-34.9)    Tattoos              History reviewed. No pertinent surgical history.             Social History     Socioeconomic History    Marital status:    Tobacco Use    Smoking status: Never    Smokeless tobacco: Never   Vaping Use    Vaping status: Never Used   Substance and Sexual Activity    Alcohol use: Not Currently     Comment: rarely prior to preg    Drug use: Never     Social Determinants of Health     Financial Resource Strain: Unknown (9/12/2024)    Financial Resource Strain     Difficulty of Paying Living Expenses: Patient declined     Med Affordability: No   Food Insecurity: Unknown (9/12/2024)    Food Insecurity     Food Insecurity: Patient declined   Transportation Needs: Unknown (9/12/2024)    Transportation Needs     Lack of Transportation: Patient declined   Stress: No Stress Concern Present (9/12/2024)    Stress     Feeling of Stress : No   Housing Stability: Low Risk  (9/12/2024)    Housing Stability     Housing Instability: No              Review of Systems    Positive for stated Chief Complaint: Eval-G    Other systems are as noted in HPI.  Constitutional and vital signs reviewed.      All other systems reviewed and negative except as noted above.    Physical Exam     ED Triage Vitals [09/18/24 0900]   /76   Pulse 88   Resp 20   Temp 97.6 °F (36.4 °C)   Temp src Temporal   SpO2 99 %   O2 Device None (Room air)       Current Vitals:   Vital Signs  BP: 92/63  Pulse: 81  Resp:  20  Temp: 97.6 °F (36.4 °C)  Temp src: Temporal  Fetal Heart Tones: 158  MAP (mmHg): 71    Oxygen Therapy  SpO2: 98 %  O2 Device: None (Room air)            Physical Exam    Constitutional: Oriented to person, place, and time.  Appears well-developed. No distress.   Head: Normocephalic and atraumatic.   Eyes: Conjunctivae are normal. Pupils are equal, round, and reactive to light.   Cardiovascular: Normal rate, regular rhythm and intact distal pulses.    Pulmonary/Chest: Effort normal. No respiratory distress.   Abdominal: Soft.  Mild suprapubic tenderness.  No guarding or peritoneal signs or other focal abdominal pain.  Musculoskeletal: Normal range of motion. No edema or tenderness.   Neurological: Alert and oriented to person, place, and time.   Skin: Skin is warm and dry.     Nursing note and vitals reviewed.    Differential diagnosis includes subchorionic hemorrhage, threatened miscarriage.      ED Course     Labs Reviewed   CBC WITH DIFFERENTIAL WITH PLATELET - Abnormal; Notable for the following components:       Result Value    HGB 11.1 (*)     MCV 72.2 (*)     MCH 22.9 (*)     RDW 18.3 (*)     All other components within normal limits   COMP METABOLIC PANEL (14) - Abnormal; Notable for the following components:    BUN <5 (*)     Creatinine 0.47 (*)     All other components within normal limits   URINALYSIS WITH CULTURE REFLEX   RAINBOW DRAW LAVENDER   RAINBOW DRAW LIGHT GREEN   RAINBOW DRAW BLUE                      MDM                                         Medical Decision Making  Bedside fetal heart tones done by me patient looks well.  Urine will be sent.  She will go to her regular more detailed ultrasound at this time and see her [facility] doctor.  Encouraged Tylenol only for pain.  She will come back with worsening or change.  Showed a rate between 154 and 158.    Problems Addressed:  Threatened miscarriage (HCC): acute illness or injury    Amount and/or Complexity of Data Reviewed  External Data  Reviewed: labs.     Details: 11/5/23 Rh + by review of the labs  Labs: ordered. Decision-making details documented in ED Course.    Risk  OTC drugs.        Disposition and Plan     Clinical Impression:  1. Threatened miscarriage (HCC)         Disposition:  Discharge  9/18/2024 10:33 am    Follow-up:  YOUR OB/MFM DOCTOR    Follow up today            Medications Prescribed:  Current Discharge Medication List

## 2024-09-22 PROBLEM — O99.012 ANEMIA COMPLICATING PREGNANCY, SECOND TRIMESTER (HCC): Status: ACTIVE | Noted: 2024-09-22

## 2024-10-09 ENCOUNTER — ROUTINE PRENATAL (OUTPATIENT)
Dept: OBGYN CLINIC | Facility: CLINIC | Age: 24
End: 2024-10-09
Payer: MEDICAID

## 2024-10-09 ENCOUNTER — LAB ENCOUNTER (OUTPATIENT)
Dept: LAB | Facility: HOSPITAL | Age: 24
End: 2024-10-09
Attending: OBSTETRICS & GYNECOLOGY
Payer: MEDICAID

## 2024-10-09 ENCOUNTER — TELEPHONE (OUTPATIENT)
Dept: OBGYN CLINIC | Facility: CLINIC | Age: 24
End: 2024-10-09

## 2024-10-09 VITALS
BODY MASS INDEX: 36 KG/M2 | SYSTOLIC BLOOD PRESSURE: 102 MMHG | DIASTOLIC BLOOD PRESSURE: 68 MMHG | WEIGHT: 190.81 LBS | HEART RATE: 77 BPM

## 2024-10-09 DIAGNOSIS — Z34.82 ENCOUNTER FOR SUPERVISION OF OTHER NORMAL PREGNANCY IN SECOND TRIMESTER (HCC): ICD-10-CM

## 2024-10-09 DIAGNOSIS — O99.019 ANTEPARTUM ANEMIA (HCC): ICD-10-CM

## 2024-10-09 DIAGNOSIS — Z34.82 ENCOUNTER FOR SUPERVISION OF OTHER NORMAL PREGNANCY IN SECOND TRIMESTER (HCC): Primary | ICD-10-CM

## 2024-10-09 LAB
APPEARANCE: CLEAR
BILIRUBIN: NEGATIVE
DEPRECATED HBV CORE AB SER IA-ACNC: 6 NG/ML
DEPRECATED RDW RBC AUTO: 46.4 FL (ref 35.1–46.3)
ERYTHROCYTE [DISTWIDTH] IN BLOOD BY AUTOMATED COUNT: 17.5 % (ref 11–15)
GLUCOSE (URINE DIPSTICK): NEGATIVE MG/DL
HCT VFR BLD AUTO: 35.4 %
HGB BLD-MCNC: 10.9 G/DL
KETONES (URINE DIPSTICK): NEGATIVE MG/DL
MCH RBC QN AUTO: 22.6 PG (ref 26–34)
MCHC RBC AUTO-ENTMCNC: 30.8 G/DL (ref 31–37)
MCV RBC AUTO: 73.4 FL
MULTISTIX LOT#: ABNORMAL NUMERIC
NITRITE, URINE: NEGATIVE
OCCULT BLOOD: NEGATIVE
PH, URINE: 6.5 (ref 4.5–8)
PLATELET # BLD AUTO: 397 10(3)UL (ref 150–450)
PROTEIN (URINE DIPSTICK): 30 MG/DL
RBC # BLD AUTO: 4.82 X10(6)UL
SPECIFIC GRAVITY: 1.02 (ref 1–1.03)
URINE-COLOR: YELLOW
UROBILINOGEN,SEMI-QN: 0.2 MG/DL (ref 0–1.9)
WBC # BLD AUTO: 8.7 X10(3) UL (ref 4–11)

## 2024-10-09 PROCEDURE — 99214 OFFICE O/P EST MOD 30 MIN: CPT | Performed by: OBSTETRICS & GYNECOLOGY

## 2024-10-09 PROCEDURE — 90471 IMMUNIZATION ADMIN: CPT | Performed by: OBSTETRICS & GYNECOLOGY

## 2024-10-09 PROCEDURE — 87086 URINE CULTURE/COLONY COUNT: CPT

## 2024-10-09 PROCEDURE — 82728 ASSAY OF FERRITIN: CPT

## 2024-10-09 PROCEDURE — 85027 COMPLETE CBC AUTOMATED: CPT

## 2024-10-09 PROCEDURE — 36415 COLL VENOUS BLD VENIPUNCTURE: CPT

## 2024-10-09 PROCEDURE — 81002 URINALYSIS NONAUTO W/O SCOPE: CPT | Performed by: OBSTETRICS & GYNECOLOGY

## 2024-10-09 PROCEDURE — 90656 IIV3 VACC NO PRSV 0.5 ML IM: CPT | Performed by: OBSTETRICS & GYNECOLOGY

## 2024-10-09 NOTE — TELEPHONE ENCOUNTER
Spoke with CAPO who stated that pt needs to complete the CBC and ferritin that were ordered for her back in September. Called medhat from registration to inform her.

## 2024-10-09 NOTE — TELEPHONE ENCOUNTER
Patient just in with Ghia and waiting in lab and asking what labs are to be done today    Patient confused and the orders in the system look old.    Pls advise

## 2024-10-09 NOTE — PROGRESS NOTES
Pt tolerated Flu vaccine well to Right deltoid. Pt had no adverse reactions, VIS sheet given to pt, and signed consent form sent to scanning.

## 2024-10-10 NOTE — PROGRESS NOTES
MFM appointment 10/31. Wishes for flu shot. Needs ferritin check done -- order already in computer. Reviewed MFM recommendations in detail -- needs fetal echo at 24 / level 2 at 20 / growth at 32 & nst at 36. Reviewed listing of no blood products -- pt accepts blood transfusion if emergency.    See Prenatal Vitals flowsheet for documentation of vitals / urine / exam. See problem list -- updated in detail.   Used 30-39 min in order to do detailed chart review & review of blood work / ultrasound reports / MFM reports, reviewing history, performing prenatal exam / pelvic exam, counseling pt on vaccines, prenatal education, ordering tests or meds, ordering MFM /specialist referrals, documentation in EMR, interpretation / communication of results & care coordination

## 2024-10-11 DIAGNOSIS — Z82.79 FAMILY HISTORY OF COMPLEX CONGENITAL HEART DISEASE: Primary | ICD-10-CM

## 2024-10-31 ENCOUNTER — HOSPITAL ENCOUNTER (OUTPATIENT)
Dept: PERINATAL CARE | Facility: HOSPITAL | Age: 24
Discharge: HOME | End: 2024-10-31
Attending: OBSTETRICS & GYNECOLOGY
Payer: MEDICAID

## 2024-10-31 VITALS
HEART RATE: 90 BPM | WEIGHT: 192 LBS | DIASTOLIC BLOOD PRESSURE: 68 MMHG | SYSTOLIC BLOOD PRESSURE: 100 MMHG | BODY MASS INDEX: 36 KG/M2

## 2024-10-31 DIAGNOSIS — Z36.89 ENCOUNTER FOR FETAL ANATOMIC SURVEY (HCC): ICD-10-CM

## 2024-10-31 DIAGNOSIS — O99.212 OBESITY AFFECTING PREGNANCY IN SECOND TRIMESTER, UNSPECIFIED OBESITY TYPE (HCC): ICD-10-CM

## 2024-10-31 DIAGNOSIS — Z82.79 FAMILY HISTORY OF COMPLEX CONGENITAL HEART DISEASE: ICD-10-CM

## 2024-10-31 DIAGNOSIS — O99.212 OBESITY AFFECTING PREGNANCY IN SECOND TRIMESTER, UNSPECIFIED OBESITY TYPE (HCC): Primary | ICD-10-CM

## 2024-10-31 DIAGNOSIS — O44.42 LOW LYING PLACENTA NOS OR WITHOUT HEMORRHAGE, SECOND TRIMESTER (HCC): ICD-10-CM

## 2024-10-31 PROCEDURE — 76811 OB US DETAILED SNGL FETUS: CPT | Performed by: OBSTETRICS & GYNECOLOGY

## 2024-10-31 NOTE — PROGRESS NOTES
Outpatient Maternal-Fetal Medicine Follow-Up     Dear Dr. Decker,     Thank you for requesting ultrasound evaluation and maternal fetal medicine consultation on your patient Treasure Acevedo.  As you are aware she is a 22/23 year old female  with a Olsen pregnancy and an Estimated Date of Delivery: 3/10/25.  She returned to maternal-fetal medicine today for a follow-up visit.  Her history was reviewed from her prior visit and there were no interval changes.     Antepartum Risk Factors  Class II obesity complicating pregnancy  Family history of congenital heart defects  Subchorionic hemorrhage in the second trimester, resolved    S: No spotting or bleeding recently.     PHYSICAL EXAMINATION:  /68   Pulse 90   Wt 192 lb (87.1 kg)   LMP 06/10/2024 (Exact Date)   BMI 36.28 kg/m²   General: alert and oriented, no acute distress  Abdomen: gravid, soft, non-tender  Extremities: non-tender, no edema     OBSTETRIC ULTRASOUND  The patient had a level 2 ultrasound today which I interpreted the results and reviewed them with the patient.    Ultrasound Findings:  Single IUP in cephalic  presentation.    Placenta is posterior.   A 3 vessel cord is noted.  Cardiac activity is present at 157 bpm   g ( 1 lb 1 oz);   MVP is 4.9 cm .     The fetal measurements are consistent with the established EDC. No ultrasound evidence of structural abnormalities are seen today. The nasal bone is present. No ultrasound evidence of markers for aneuploidy are seen. She understands that ultrasound exam cannot exclude genetic abnormalities and that genetic testing is recommended. The limitations of ultrasound were discussed.     Uterus and adnexa appeared normal  today on US    See imaging tab for the complete US report.     DISCUSSION  During her visit we discussed and reviewed the following issues:  OBESITY:  Her BMI prior to pregnancy was 36.11  Obesity during pregnancy is associated with numerous maternal and   risks which was discussed previously and reviewed.  See MFM note from 24 for detailed review. We discussed the current recommendations for limited gestational weight gain in pregnancy for overweight and obese women.  The [place] of Medicine currently recommends that women keep gestational weight gain to between 8-18 lbs.  We discussed the role of mild to moderate exercise, healthy food choices and appropriate portions sized to help achieve this goal.  Excess weight gain is associated with higher rates of gestational diabetes, hypertensive complications, fetal macrosomia and delivery complications.  Women with weight loss or insufficient weight gain have higher rates of small for gestational age infants.    A recent study found that initiating moderate exercise in early pregnancy for obese gravidas significantly reduced the incidence of gestational diabetes, gestational hypertension,  deliveries and C-sections.  In the study, women were assigned to riding a stationary cycle for  30 minutes 3 times per week, keeping HR<140 bpm.  I encouraged Treasure to begin moderate exercise such as walking or stationary bike in the pregnancy.    Congenital Heart Defects:  Congenital heart disease (CHD) and recurrence risk in families was discussed previously and reviewed.  See MFM note from 24 for detailed review.        LOW LYING PLACENTA: RESLOLVED 10/31/24   See MFM note from 24 for detailed review.    We discussed the recommended plan of care based on her  risk factors.  Treasure and her significant other, Bereket, had their questions answered to their satisfaction.       IMPRESSION:  IUP at 21w3d  Normal level II Ultrasound  Class II obesity  Prior low lying Placenta has resolved  Family history of congenital heart defect (FOB siblings)     RECOMMENDATIONS:  Continue care with Dr. Nuñez  Weekly NST at 36 wks  Fetal echocardiogram at 24 weeks  Follow-up growth and BPP at 32 weeks  Limit gestational  weight gain to less than 20 pounds     Total time spent 40 minutes this calendar day which includes preparing to see the patient including chart review, obtaining and/or reviewing additional medical history, performing a physical exam and evaluation, documenting clinical information in the electronic medical record, independently interpreting results, counseling the patient, communicating results to the patient/family/caregiver and coordinating care.     Case discussed with patient who demonstrated understanding and agreement with plan.     Thank you for allowing me to participate in the care of this patient.  Please feel free to contact me with any questions.    Diamond Sweeney MD  Maternal-Fetal Medicine       Note to patient and family:  The 21st Century Cures Act makes medical notes available to patients in the interest of transparency.  However, please be advised that this is a medical document.  It is intended as a peer to peer communication.  It is written in medical language and may contain abbreviations or verbiage that are technical and unfamiliar.  It may appear blunt or direct.  Medical documents are intended to carry relevant information, facts as evident, and the clinical opinion of the practitioner.

## 2024-11-05 ENCOUNTER — ROUTINE PRENATAL (OUTPATIENT)
Dept: OBGYN CLINIC | Facility: CLINIC | Age: 24
End: 2024-11-05
Payer: MEDICAID

## 2024-11-05 VITALS
HEART RATE: 80 BPM | DIASTOLIC BLOOD PRESSURE: 70 MMHG | SYSTOLIC BLOOD PRESSURE: 103 MMHG | WEIGHT: 192 LBS | BODY MASS INDEX: 36 KG/M2

## 2024-11-05 DIAGNOSIS — Z34.92 ENCOUNTER FOR SUPERVISION OF NORMAL PREGNANCY IN SECOND TRIMESTER, UNSPECIFIED GRAVIDITY (HCC): Primary | ICD-10-CM

## 2024-11-05 LAB
BILIRUBIN: NEGATIVE
GLUCOSE (URINE DIPSTICK): NEGATIVE MG/DL
KETONES (URINE DIPSTICK): NEGATIVE MG/DL
MULTISTIX LOT#: ABNORMAL NUMERIC
NITRITE, URINE: NEGATIVE
OCCULT BLOOD: NEGATIVE
PH, URINE: 6.5 (ref 4.5–8)
PROTEIN (URINE DIPSTICK): NEGATIVE MG/DL
SPECIFIC GRAVITY: 1 (ref 1–1.03)
UROBILINOGEN,SEMI-QN: 0.2 MG/DL (ref 0–1.9)

## 2024-11-05 PROCEDURE — 81002 URINALYSIS NONAUTO W/O SCOPE: CPT | Performed by: OBSTETRICS & GYNECOLOGY

## 2024-11-05 PROCEDURE — 99213 OFFICE O/P EST LOW 20 MIN: CPT | Performed by: OBSTETRICS & GYNECOLOGY

## 2024-11-05 NOTE — PROGRESS NOTES
Feeling well  Good fetal movement.  NO cramping/vaginal bleeding  Fetal echo scheduled. RTC 4 wks

## 2024-11-15 NOTE — PROGRESS NOTES
Outpatient Maternal-Fetal Medicine Follow-Up    Dear Dr. Nuñez    Thank you for requesting ultrasound evaluation and maternal fetal medicine consultation on your patient Treasure Acevedo.  As you are aware she is a 24 year old female  with a gallegos pregnancy and an Estimated Date of Delivery: 3/10/25.  She returned to maternal-fetal medicine today for a follow-up visit.  Her history was reviewed from her prior visit and there were no interval changes.    Antepartum Risk Factors  Class II obesity  Family history of congenital heart defect (FOB siblings)    PHYSICAL EXAMINATION:  /73   Pulse 96   Wt 195 lb (88.5 kg)   LMP 06/10/2024 (Exact Date)   BMI 36.84 kg/m²   General: alert and oriented, no acute distress  Abdomen: gravid, soft, non-tender  Extremities: non-tender, no edema    OBSTETRIC ULTRASOUND    FETAL ECHOCARDIOGRAM:    Single IUP in breech presentation.    Placenta is posterior.   A 3 vessel cord is noted.  Cardiac activity is present at 157 bpm  MVP is 5.6 cm .      A transabdominal 2D Doppler, fetal echocardiogram is performed. There is a four-chamber heart of appropriate cardiac dimensions. 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 Intracardiac connection appear to be normal.  The  atrioventricular valves appear normal. There is no evidence of pericardial or pleural effusion. The A-V conduction is one to one and the heart rate is appropriate for gestational age. No evidence of fetal arrhythmias is seen during today's study. There is a right to left shunting across the patent temitope ovale. There is a normal appearance of the aorta and branching pattern of the head vessels.    Normal fetal Doppler interrogations    There appears to be a structurally  normal fetal heart and rhythm. The patient was made aware of the limitations of the fetal heart study: malformations such as but not limiting to minor valve , VSD, anomalous pulmonary venus return, or coarctation of the aorta maybe missed. I  discussed the results with the patient.          See PACS/Imaging Tab For Complete Ultrasound Report  I interpreted the results and reviewed them with the patient.    DISCUSSION  During her visit we discussed and reviewed the following issues:  OBESITY  See prior MFM notes for a detailed review.    FAMILY HISTORY OF CONGENITAL HEART DISEASE  Reassuring fetal echocardiogram today.    IMPRESSION:  IUP at 24w4d  Class II obesity  Family history of congenital heart defect (FOB siblings)     RECOMMENDATIONS:  Continue care with Dr. Nuñez  Weekly NST at 36 wks  Follow-up growth and BPP at 32 weeks  Limit gestational weight gain to less than 20 pounds      Thank you for allowing me to participate in the care of your patient.  Please do not hesitate to contact me if additional questions or concerns arise.      Luis Antonio Murphy M.D.    30 minutes spent in review of records, patient consultation, documentation and coordination of care.  The relevant clinical matter(s) are summarized above.     Note to patient and family  The 21st Century Cures Act makes medical notes available to patients in the interest of transparency.  However, please be advised that this is a medical document.  It is intended as ojbx-na-bzad communication.  It is written and medical language may contain abbreviations or verbiage that are technical and unfamiliar.  It may appear blunt or direct.  Medical documents are intended to carry relevant information, facts as evident, and the clinical opinion of the practitioner.

## 2024-11-22 ENCOUNTER — HOSPITAL ENCOUNTER (OUTPATIENT)
Dept: PERINATAL CARE | Facility: HOSPITAL | Age: 24
Discharge: HOME | End: 2024-11-22
Attending: OBSTETRICS & GYNECOLOGY
Payer: MEDICAID

## 2024-11-22 VITALS
HEART RATE: 96 BPM | SYSTOLIC BLOOD PRESSURE: 113 MMHG | BODY MASS INDEX: 37 KG/M2 | DIASTOLIC BLOOD PRESSURE: 73 MMHG | WEIGHT: 195 LBS

## 2024-11-22 DIAGNOSIS — Z82.79 FAMILY HISTORY OF CONGENITAL ANOMALY OF CARDIOVASCULAR SYSTEM: ICD-10-CM

## 2024-11-22 DIAGNOSIS — Z82.79 FAMILY HISTORY OF COMPLEX CONGENITAL HEART DISEASE: ICD-10-CM

## 2024-11-22 DIAGNOSIS — Z82.79 FAMILY HISTORY OF CONGENITAL ANOMALY OF CARDIOVASCULAR SYSTEM: Primary | ICD-10-CM

## 2024-11-22 PROCEDURE — 93325 DOPPLER ECHO COLOR FLOW MAPG: CPT

## 2024-11-22 PROCEDURE — 76827 ECHO EXAM OF FETAL HEART: CPT

## 2024-11-22 PROCEDURE — 76825 ECHO EXAM OF FETAL HEART: CPT | Performed by: OBSTETRICS & GYNECOLOGY

## 2024-12-16 ENCOUNTER — ROUTINE PRENATAL (OUTPATIENT)
Dept: OBGYN CLINIC | Facility: CLINIC | Age: 24
End: 2024-12-16

## 2024-12-16 ENCOUNTER — LAB ENCOUNTER (OUTPATIENT)
Dept: LAB | Facility: HOSPITAL | Age: 24
End: 2024-12-16
Attending: OBSTETRICS & GYNECOLOGY
Payer: MEDICAID

## 2024-12-16 VITALS
DIASTOLIC BLOOD PRESSURE: 73 MMHG | SYSTOLIC BLOOD PRESSURE: 109 MMHG | HEART RATE: 90 BPM | BODY MASS INDEX: 36 KG/M2 | WEIGHT: 191.19 LBS

## 2024-12-16 DIAGNOSIS — Z34.93 ENCOUNTER FOR SUPERVISION OF NORMAL PREGNANCY IN THIRD TRIMESTER, UNSPECIFIED GRAVIDITY (HCC): Primary | ICD-10-CM

## 2024-12-16 DIAGNOSIS — Z34.93 ENCOUNTER FOR SUPERVISION OF NORMAL PREGNANCY IN THIRD TRIMESTER, UNSPECIFIED GRAVIDITY (HCC): ICD-10-CM

## 2024-12-16 LAB
DEPRECATED RDW RBC AUTO: 43.6 FL (ref 35.1–46.3)
ERYTHROCYTE [DISTWIDTH] IN BLOOD BY AUTOMATED COUNT: 17 % (ref 11–15)
GLUCOSE (URINE DIPSTICK): NEGATIVE MG/DL
GLUCOSE 1H P GLC SERPL-MCNC: 80 MG/DL
HCT VFR BLD AUTO: 31 %
HGB BLD-MCNC: 9.5 G/DL
KETONES (URINE DIPSTICK): 15 MG/DL
MCH RBC QN AUTO: 21.9 PG (ref 26–34)
MCHC RBC AUTO-ENTMCNC: 30.6 G/DL (ref 31–37)
MCV RBC AUTO: 71.4 FL
MULTISTIX LOT#: ABNORMAL NUMERIC
NITRITE, URINE: NEGATIVE
OCCULT BLOOD: NEGATIVE
PH, URINE: 6 (ref 4.5–8)
PLATELET # BLD AUTO: 395 10(3)UL (ref 150–450)
PROTEIN (URINE DIPSTICK): NEGATIVE MG/DL
RBC # BLD AUTO: 4.34 X10(6)UL
SPECIFIC GRAVITY: 1.02 (ref 1–1.03)
UROBILINOGEN,SEMI-QN: 0.2 MG/DL (ref 0–1.9)
WBC # BLD AUTO: 9.5 X10(3) UL (ref 4–11)

## 2024-12-16 PROCEDURE — 81002 URINALYSIS NONAUTO W/O SCOPE: CPT | Performed by: OBSTETRICS & GYNECOLOGY

## 2024-12-16 PROCEDURE — 99213 OFFICE O/P EST LOW 20 MIN: CPT | Performed by: OBSTETRICS & GYNECOLOGY

## 2024-12-16 PROCEDURE — 36415 COLL VENOUS BLD VENIPUNCTURE: CPT

## 2024-12-16 PROCEDURE — 82950 GLUCOSE TEST: CPT

## 2024-12-16 PROCEDURE — 85027 COMPLETE CBC AUTOMATED: CPT

## 2024-12-16 NOTE — PROGRESS NOTES
See Prenatal Vitals flowsheet for documentation of vitals / urine / exam. See problem list -- updated in detail.   Used 20-29 min in order to do detailed chart review & review of blood work / ultrasound reports / MFM reports, reviewing history, performing prenatal exam / pelvic exam, counseling pt on vaccines, prenatal education, ordering tests or meds, ordering MFM /specialist referrals, documentation in EMR, interpretation / communication of results & care coordination  Boy. Reviewed fetal echo report. Occasional B-H contractions but nothing regular. Doing GTT today. Maternal Fetal Medicine growth scheduled.

## 2025-01-03 ENCOUNTER — LAB ENCOUNTER (OUTPATIENT)
Dept: LAB | Facility: HOSPITAL | Age: 25
End: 2025-01-03
Attending: OBSTETRICS & GYNECOLOGY
Payer: MEDICAID

## 2025-01-03 ENCOUNTER — ROUTINE PRENATAL (OUTPATIENT)
Dept: OBGYN CLINIC | Facility: CLINIC | Age: 25
End: 2025-01-03
Payer: MEDICAID

## 2025-01-03 VITALS
WEIGHT: 193.38 LBS | SYSTOLIC BLOOD PRESSURE: 105 MMHG | BODY MASS INDEX: 37 KG/M2 | DIASTOLIC BLOOD PRESSURE: 69 MMHG | HEART RATE: 85 BPM

## 2025-01-03 DIAGNOSIS — Z34.93 ENCOUNTER FOR SUPERVISION OF NORMAL PREGNANCY IN THIRD TRIMESTER, UNSPECIFIED GRAVIDITY (HCC): ICD-10-CM

## 2025-01-03 DIAGNOSIS — Z34.93 ENCOUNTER FOR SUPERVISION OF NORMAL PREGNANCY IN THIRD TRIMESTER, UNSPECIFIED GRAVIDITY (HCC): Primary | ICD-10-CM

## 2025-01-03 LAB
APPEARANCE: CLEAR
BILIRUBIN: NEGATIVE
GLUCOSE (URINE DIPSTICK): NEGATIVE MG/DL
KETONES (URINE DIPSTICK): NEGATIVE MG/DL
MULTISTIX LOT#: ABNORMAL NUMERIC
NITRITE, URINE: NEGATIVE
OCCULT BLOOD: NEGATIVE
PH, URINE: 6.5 (ref 4.5–8)
PROTEIN (URINE DIPSTICK): NEGATIVE MG/DL
SPECIFIC GRAVITY: 1.01 (ref 1–1.03)
T PALLIDUM AB SER QL IA: NONREACTIVE
URINE-COLOR: YELLOW
UROBILINOGEN,SEMI-QN: 0.2 MG/DL (ref 0–1.9)

## 2025-01-03 PROCEDURE — 81002 URINALYSIS NONAUTO W/O SCOPE: CPT | Performed by: OBSTETRICS & GYNECOLOGY

## 2025-01-03 PROCEDURE — 99213 OFFICE O/P EST LOW 20 MIN: CPT | Performed by: OBSTETRICS & GYNECOLOGY

## 2025-01-03 PROCEDURE — 86780 TREPONEMA PALLIDUM: CPT

## 2025-01-03 PROCEDURE — 36415 COLL VENOUS BLD VENIPUNCTURE: CPT

## 2025-01-03 PROCEDURE — 87389 HIV-1 AG W/HIV-1&-2 AB AG IA: CPT

## 2025-01-13 ENCOUNTER — HOSPITAL ENCOUNTER (OUTPATIENT)
Dept: PERINATAL CARE | Facility: HOSPITAL | Age: 25
End: 2025-01-13
Attending: OBSTETRICS & GYNECOLOGY
Payer: MEDICAID

## 2025-01-13 ENCOUNTER — HOSPITAL ENCOUNTER (OUTPATIENT)
Dept: PERINATAL CARE | Facility: HOSPITAL | Age: 25
Discharge: HOME | End: 2025-01-13
Attending: OBSTETRICS & GYNECOLOGY
Payer: MEDICAID

## 2025-01-13 VITALS
WEIGHT: 193 LBS | HEART RATE: 82 BPM | SYSTOLIC BLOOD PRESSURE: 104 MMHG | BODY MASS INDEX: 36 KG/M2 | DIASTOLIC BLOOD PRESSURE: 71 MMHG

## 2025-01-13 DIAGNOSIS — Z82.79 FAMILY HISTORY OF CONGENITAL HEART DISEASE: ICD-10-CM

## 2025-01-13 DIAGNOSIS — E66.09 OTHER OBESITY DUE TO EXCESS CALORIES AFFECTING PREGNANCY IN THIRD TRIMESTER (HCC): Primary | ICD-10-CM

## 2025-01-13 DIAGNOSIS — O99.213 OTHER OBESITY DUE TO EXCESS CALORIES AFFECTING PREGNANCY IN THIRD TRIMESTER (HCC): Primary | ICD-10-CM

## 2025-01-13 DIAGNOSIS — O99.210 OTHER OBESITY DUE TO EXCESS CALORIES AFFECTING PREGNANCY, ANTEPARTUM (HCC): ICD-10-CM

## 2025-01-13 DIAGNOSIS — O99.210 OBESITY IN PREGNANCY (HCC): ICD-10-CM

## 2025-01-13 DIAGNOSIS — E66.09 OTHER OBESITY DUE TO EXCESS CALORIES AFFECTING PREGNANCY, ANTEPARTUM (HCC): ICD-10-CM

## 2025-01-13 PROCEDURE — 76816 OB US FOLLOW-UP PER FETUS: CPT | Performed by: OBSTETRICS & GYNECOLOGY

## 2025-01-13 PROCEDURE — 76819 FETAL BIOPHYS PROFIL W/O NST: CPT

## 2025-01-13 NOTE — PROGRESS NOTES
Outpatient Maternal-Fetal Medicine Follow-Up    Dear Dr. Nuñez    Thank you for requesting ultrasound evaluation and maternal fetal medicine consultation on your patient Treasure Acevedo.  As you are aware she is a 24 year old female  with a gallegos pregnancy and an Estimated Date of Delivery: 3/10/25.  She returned to maternal-fetal medicine today for a follow-up visit.  Her history was reviewed from her prior visit and there were no interval changes.    Passed glucose screen.    Antepartum Risk Factors  Class II obesity  Family history of congenital heart defect (FOB siblings)    PHYSICAL EXAMINATION:  /71   Pulse 82   Wt 193 lb (87.5 kg)   LMP 06/10/2024 (Exact Date)   BMI 36.47 kg/m²   General: alert and oriented, no acute distress  Abdomen: gravid, soft, non-tender      OBSTETRIC ULTRASOUND  Ultrasound Findings:  Single IUP in cephalic presentation.    Placenta is posterior.   A 3 vessel cord is noted.  Cardiac activity is present at 138 bpm  EFW 2056 g ( 4 lb 9 oz); 59%.    STEPHEN is  13.9 cm.  MVP is 4.1 cm  BPP is 8/8.     The fetal measurements are consistent with established EDC. No gross ultrasound evidence of structural abnormalities are seen today. The patient understands that ultrasound cannot rule out all structural and chromosomal abnormalities.      See PACS/Imaging Tab For Complete Ultrasound Report  I interpreted the results and reviewed them with the patient.    DISCUSSION  During her visit we discussed and reviewed the following issues:  OBESITY  See prior M notes for a detailed review.    FAMILY HISTORY OF CONGENITAL HEART DISEASE  Reassuring fetal echocardiogram.    GLUCOSE 1HR OB   Date Value Ref Range Status   2024 80 See comment mg/dL Final     Comment:     If the plasma glucose level measured after 1 hour is >=130, 135 or 140 mg/dl proceed to \"Glucose Tolerance, 100 gm (0 hr, 1 hr, 2hr, 3hr), Gestational (ADA)\" test on a separate day, as clinically indicated.        09/09/2024 88 See comment mg/dL Final     Comment:     If the plasma glucose level measured after 1 hour is >=130, 135 or 140 mg/dl proceed to \"Glucose Tolerance, 100 gm (0 hr, 1 hr, 2hr, 3hr), Gestational (ADA)\" test on a separate day, as clinically indicated.          IMPRESSION:  IUP at 32w0d   Class II obesity  Family history of congenital heart defect (FOB siblings)     RECOMMENDATIONS:  Continue care with Dr. Nuñez  Weekly NST at 36 wks  Limit gestational weight gain to less than 20 pounds  Recommend 18-24 months between pregnancies.      Thank you for allowing me to participate in the care of your patient.  Please do not hesitate to contact me if additional questions or concerns arise.      Ashley Ma MD .    20  minutes spent in review of records, patient consultation, documentation and coordination of care.  The relevant clinical matter(s) are summarized above.     Note to patient and family  The 21st Century Cures Act makes medical notes available to patients in the interest of transparency.  However, please be advised that this is a medical document.  It is intended as bsgp-yf-khns communication.  It is written and medical language may contain abbreviations or verbiage that are technical and unfamiliar.  It may appear blunt or direct.  Medical documents are intended to carry relevant information, facts as evident, and the clinical opinion of the practitioner.

## 2025-01-17 ENCOUNTER — ROUTINE PRENATAL (OUTPATIENT)
Dept: OBGYN CLINIC | Facility: CLINIC | Age: 25
End: 2025-01-17
Payer: MEDICAID

## 2025-01-17 VITALS
BODY MASS INDEX: 37 KG/M2 | SYSTOLIC BLOOD PRESSURE: 109 MMHG | HEART RATE: 112 BPM | DIASTOLIC BLOOD PRESSURE: 75 MMHG | WEIGHT: 195.63 LBS

## 2025-01-17 DIAGNOSIS — Z34.93 ENCOUNTER FOR SUPERVISION OF NORMAL PREGNANCY IN THIRD TRIMESTER, UNSPECIFIED GRAVIDITY (HCC): Primary | ICD-10-CM

## 2025-01-17 PROCEDURE — 90715 TDAP VACCINE 7 YRS/> IM: CPT | Performed by: OBSTETRICS & GYNECOLOGY

## 2025-01-17 PROCEDURE — 90471 IMMUNIZATION ADMIN: CPT | Performed by: OBSTETRICS & GYNECOLOGY

## 2025-01-17 PROCEDURE — 81002 URINALYSIS NONAUTO W/O SCOPE: CPT | Performed by: OBSTETRICS & GYNECOLOGY

## 2025-01-17 PROCEDURE — 99214 OFFICE O/P EST MOD 30 MIN: CPT | Performed by: OBSTETRICS & GYNECOLOGY

## 2025-01-17 NOTE — PROGRESS NOTES
Problem list updated fully.  Flu vaccine -- received  TDAP -- wishes today  RSV -- needs to investigate coverage -- if wishes, recommend returning on RN visit schedule    Please call your insurance about RSV vaccine coverage called [employer]. You are eligible to receive if you are 32 weeks 0 days until 36 weeks 6 days during Sept 1st-Jan 31st. A fee waiver for $683 will be required to be signed even if your insurance states it will cover. Newborns may also receive the vaccine (Beyfortus) after delivery however pricing is estimated to be higher ($1200).     See Prenatal Vitals flowsheet for documentation of vitals / urine / exam. See problem list -- updated in detail.   Used 30-39 min in order to do detailed chart review & review of blood work / ultrasound reports / MFM reports, reviewing history, performing prenatal exam / pelvic exam, counseling pt on vaccines, prenatal education, ordering tests or meds, ordering MFM /specialist referrals, documentation in EMR, interpretation / communication of results & care coordination

## 2025-01-17 NOTE — PROGRESS NOTES
Pt tolerated TDap vaccine well to Right deltoid. Pt had no adverse reactions, VIS sheet given to pt, and signed consent form sent to scanning.

## 2025-01-29 ENCOUNTER — ROUTINE PRENATAL (OUTPATIENT)
Dept: OBGYN CLINIC | Facility: CLINIC | Age: 25
End: 2025-01-29

## 2025-01-29 VITALS
SYSTOLIC BLOOD PRESSURE: 102 MMHG | HEART RATE: 92 BPM | BODY MASS INDEX: 37 KG/M2 | WEIGHT: 196.63 LBS | DIASTOLIC BLOOD PRESSURE: 72 MMHG

## 2025-01-29 DIAGNOSIS — Z34.93 ENCOUNTER FOR SUPERVISION OF NORMAL PREGNANCY IN THIRD TRIMESTER, UNSPECIFIED GRAVIDITY (HCC): Primary | ICD-10-CM

## 2025-01-29 LAB
APPEARANCE: CLEAR
BILIRUBIN: NEGATIVE
GLUCOSE (URINE DIPSTICK): NEGATIVE MG/DL
KETONES (URINE DIPSTICK): NEGATIVE MG/DL
MULTISTIX LOT#: ABNORMAL NUMERIC
NITRITE, URINE: NEGATIVE
OCCULT BLOOD: NEGATIVE
PH, URINE: 6.5 (ref 4.5–8)
PROTEIN (URINE DIPSTICK): NEGATIVE MG/DL
SPECIFIC GRAVITY: 1.01 (ref 1–1.03)
URINE-COLOR: YELLOW
UROBILINOGEN,SEMI-QN: 0.2 MG/DL (ref 0–1.9)

## 2025-01-29 PROCEDURE — 81002 URINALYSIS NONAUTO W/O SCOPE: CPT | Performed by: OBSTETRICS & GYNECOLOGY

## 2025-01-29 PROCEDURE — 99212 OFFICE O/P EST SF 10 MIN: CPT | Performed by: OBSTETRICS & GYNECOLOGY

## 2025-01-29 NOTE — PROGRESS NOTES
See Prenatal Vitals flowsheet for documentation of vitals / urine / exam. See problem list -- updated in detail.   Used 10-19 min in order to do detailed chart review & review of blood work / ultrasound reports / MFM reports, reviewing history, performing prenatal exam / pelvic exam, counseling pt on vaccines, prenatal education, ordering tests or meds, ordering MFM /specialist referrals, documentation in EMR, interpretation / communication of results & care coordination  Boy. No current S/S of PTL. Occasionally had 5 / hour but short lived. CBC order.

## 2025-02-10 ENCOUNTER — APPOINTMENT (OUTPATIENT)
Dept: OBGYN CLINIC | Facility: HOSPITAL | Age: 25
End: 2025-02-10
Payer: MEDICAID

## 2025-02-10 ENCOUNTER — HOSPITAL ENCOUNTER (OUTPATIENT)
Facility: HOSPITAL | Age: 25
Discharge: HOME | End: 2025-02-10
Attending: OBSTETRICS & GYNECOLOGY | Admitting: OBSTETRICS & GYNECOLOGY
Payer: MEDICAID

## 2025-02-10 VITALS — SYSTOLIC BLOOD PRESSURE: 115 MMHG | DIASTOLIC BLOOD PRESSURE: 72 MMHG | HEART RATE: 78 BPM

## 2025-02-10 DIAGNOSIS — Z34.90 PREGNANCY (HCC): ICD-10-CM

## 2025-02-10 PROCEDURE — 59025 FETAL NON-STRESS TEST: CPT | Performed by: OBSTETRICS & GYNECOLOGY

## 2025-02-10 PROCEDURE — 59025 FETAL NON-STRESS TEST: CPT

## 2025-02-11 NOTE — PROGRESS NOTES
Pt is a 25 year old female admitted to TR2/TR2-A.     Chief Complaint   Patient presents with    Non-stress Test     Fam hx of congenital heart defect, elevated BMI, low lying placenta      Pt is  36w0d intra-uterine pregnancy.  History obtained, consents signed. Oriented to room, staff, and plan of care.

## 2025-02-14 ENCOUNTER — LAB ENCOUNTER (OUTPATIENT)
Dept: LAB | Facility: HOSPITAL | Age: 25
End: 2025-02-14
Attending: OBSTETRICS & GYNECOLOGY
Payer: MEDICAID

## 2025-02-14 ENCOUNTER — ROUTINE PRENATAL (OUTPATIENT)
Dept: OBGYN CLINIC | Facility: CLINIC | Age: 25
End: 2025-02-14

## 2025-02-14 VITALS
DIASTOLIC BLOOD PRESSURE: 71 MMHG | BODY MASS INDEX: 38 KG/M2 | SYSTOLIC BLOOD PRESSURE: 108 MMHG | WEIGHT: 200 LBS | HEART RATE: 82 BPM

## 2025-02-14 DIAGNOSIS — Z34.93 ENCOUNTER FOR SUPERVISION OF NORMAL PREGNANCY IN THIRD TRIMESTER, UNSPECIFIED GRAVIDITY (HCC): ICD-10-CM

## 2025-02-14 DIAGNOSIS — Z34.92 ENCOUNTER FOR SUPERVISION OF NORMAL PREGNANCY IN SECOND TRIMESTER, UNSPECIFIED GRAVIDITY (HCC): Primary | ICD-10-CM

## 2025-02-14 PROBLEM — N64.4 MASTODYNIA: Status: RESOLVED | Noted: 2024-05-25 | Resolved: 2025-02-14

## 2025-02-14 LAB
APPEARANCE: CLEAR
BILIRUBIN: NEGATIVE
GLUCOSE (URINE DIPSTICK): NEGATIVE MG/DL
KETONES (URINE DIPSTICK): 80 MG/DL
MULTISTIX LOT#: ABNORMAL NUMERIC
NITRITE, URINE: NEGATIVE
PH, URINE: 6 (ref 4.5–8)
PROTEIN (URINE DIPSTICK): NEGATIVE MG/DL
SPECIFIC GRAVITY: 1.01 (ref 1–1.03)
URINE-COLOR: YELLOW
UROBILINOGEN,SEMI-QN: 0.2 MG/DL (ref 0–1.9)

## 2025-02-14 PROCEDURE — 99213 OFFICE O/P EST LOW 20 MIN: CPT | Performed by: OBSTETRICS & GYNECOLOGY

## 2025-02-14 PROCEDURE — 36415 COLL VENOUS BLD VENIPUNCTURE: CPT

## 2025-02-14 PROCEDURE — 85027 COMPLETE CBC AUTOMATED: CPT

## 2025-02-14 PROCEDURE — 81002 URINALYSIS NONAUTO W/O SCOPE: CPT | Performed by: OBSTETRICS & GYNECOLOGY

## 2025-02-14 PROCEDURE — 85060 BLOOD SMEAR INTERPRETATION: CPT

## 2025-02-14 NOTE — PROGRESS NOTES
Doing NST.  GBS.  Will do CBC.   RTC 1 week  See Prenatal Vitals flowsheet for documentation of vitals / urine / exam. See problem list -- updated in detail.   Used 20-29 min in order to do detailed chart review & review of blood work / ultrasound reports / MFM reports, reviewing history, performing prenatal exam / pelvic exam, counseling pt on vaccines, prenatal education, ordering tests or meds, ordering MFM /specialist referrals, documentation in EMR, interpretation / communication of results & care coordination

## 2025-02-15 LAB
DEPRECATED RDW RBC AUTO: 44.7 FL (ref 35.1–46.3)
ERYTHROCYTE [DISTWIDTH] IN BLOOD BY AUTOMATED COUNT: 18.5 % (ref 11–15)
GROUP B STREP BY PCR FOR PCR OVT: NEGATIVE
HCT VFR BLD AUTO: 29.1 %
HGB BLD-MCNC: 8.3 G/DL
MCH RBC QN AUTO: 19.6 PG (ref 26–34)
MCHC RBC AUTO-ENTMCNC: 28.5 G/DL (ref 31–37)
MCV RBC AUTO: 68.6 FL
PLATELET # BLD AUTO: 364 10(3)UL (ref 150–450)
RBC # BLD AUTO: 4.24 X10(6)UL
WBC # BLD AUTO: 7.3 X10(3) UL (ref 4–11)

## 2025-02-17 ENCOUNTER — NST DOCUMENTATION (OUTPATIENT)
Dept: OBGYN CLINIC | Facility: CLINIC | Age: 25
End: 2025-02-17

## 2025-02-17 ENCOUNTER — HOSPITAL ENCOUNTER (OUTPATIENT)
Dept: PERINATAL CARE | Facility: HOSPITAL | Age: 25
End: 2025-02-17
Attending: OBSTETRICS & GYNECOLOGY
Payer: MEDICAID

## 2025-02-18 ENCOUNTER — NST DOCUMENTATION (OUTPATIENT)
Dept: OBGYN CLINIC | Facility: CLINIC | Age: 25
End: 2025-02-18

## 2025-02-18 NOTE — NST
Nonstress Test   Patient: Treasure Acevedo    Gestation: 37w0d    Diagnosis from order:      Problem List:   Patient Active Problem List   Diagnosis    BMI 36    Family history of congenital heart disease    ASCUS with positive high risk HPV cervical    No blood products    Anemia complicating pregnancy, second trimester (HCC)       NST:        2025   NST DOCUMENTATION   Variability 6-25 BPM   Accelerations Yes   Decelerations None   Baseline 130 BPM   Uterine Irritability No   Contractions Not present   Acoustic Stimulator No   Nonstress Test Interpretation Reactive   NST Completed by Wanda SHEN   Disposition Home    Testing Plan Weekly NST   Provider Notified Tessy ESPINAL         I agree with the above evaluation. NST completed.  Mayela Still MD  2025  8:32 PM

## 2025-02-19 NOTE — NST
Nonstress Test   Patient: Treasure Acevedo    Gestation: 37w1d    Diagnosis from order:  Obesity    Problem List:   Patient Active Problem List   Diagnosis    BMI 36    Family history of congenital heart disease    ASCUS with positive high risk HPV cervical    No blood products    Anemia complicating pregnancy, second trimester (HCC)       NST:        2/10/2025   NST DOCUMENTATION   Variability 6-25 BPM   Accelerations Yes   Decelerations None   Baseline 125 BPM   Uterine Irritability No   Contractions Not present   Acoustic Stimulator No   Nonstress Test Interpretation Reactive   NST Completed by RN   Disposition Home    Testing Plan Weekly NST   Provider Notified Joaquin ESPINAL         I agree with the above evaluation. NST completed.  Migdalia Nuñez MD  2025  11:21 PM

## 2025-02-20 ENCOUNTER — ROUTINE PRENATAL (OUTPATIENT)
Dept: OBGYN CLINIC | Facility: CLINIC | Age: 25
End: 2025-02-20

## 2025-02-20 VITALS
BODY MASS INDEX: 38 KG/M2 | SYSTOLIC BLOOD PRESSURE: 110 MMHG | HEART RATE: 99 BPM | DIASTOLIC BLOOD PRESSURE: 75 MMHG | WEIGHT: 199.38 LBS

## 2025-02-20 DIAGNOSIS — Z34.93 ENCOUNTER FOR SUPERVISION OF NORMAL PREGNANCY IN THIRD TRIMESTER, UNSPECIFIED GRAVIDITY (HCC): Primary | ICD-10-CM

## 2025-02-20 LAB
APPEARANCE: CLEAR
BILIRUBIN: NEGATIVE
GLUCOSE (URINE DIPSTICK): NEGATIVE MG/DL
KETONES (URINE DIPSTICK): NEGATIVE MG/DL
MULTISTIX LOT#: ABNORMAL NUMERIC
NITRITE, URINE: NEGATIVE
OCCULT BLOOD: NEGATIVE
PH, URINE: 7 (ref 4.5–8)
PROTEIN (URINE DIPSTICK): NEGATIVE MG/DL
SPECIFIC GRAVITY: 1.01 (ref 1–1.03)
URINE-COLOR: YELLOW
UROBILINOGEN,SEMI-QN: 0.2 MG/DL (ref 0–1.9)

## 2025-02-20 PROCEDURE — 81002 URINALYSIS NONAUTO W/O SCOPE: CPT | Performed by: OBSTETRICS & GYNECOLOGY

## 2025-02-20 PROCEDURE — 99212 OFFICE O/P EST SF 10 MIN: CPT | Performed by: OBSTETRICS & GYNECOLOGY

## 2025-02-21 ENCOUNTER — TELEPHONE (OUTPATIENT)
Dept: OBGYN CLINIC | Facility: CLINIC | Age: 25
End: 2025-02-21

## 2025-02-21 NOTE — TELEPHONE ENCOUNTER
Received orders from Dr. Still for patient to start IV Venofer weekly until delivery. Venofer standing order completed and Dr. Still signed. Called the infusion center and they will try to get patient in by next week. Order form and insurance information faxed to the infusion center.   Called patient and informed of above. Advised patient to call clinic if she does not get a call from the infusion center by 2/26 or 2/27. Patient agrees.

## 2025-02-25 ENCOUNTER — TELEPHONE (OUTPATIENT)
Dept: OBGYN CLINIC | Facility: CLINIC | Age: 25
End: 2025-02-25

## 2025-02-25 NOTE — TELEPHONE ENCOUNTER
Pt 38w1d calling to report a headache since yesterday morning. States its been a constant headache. Pt stated she took 1 tablet of regular tylenol last night and again at 8am this morning with no relief. Pt denies blurry vision, RUQ pain, or vomiting. Pt stated that she has some sensitivity to light. Pt stated she has drank over 64oz of water. Denies hx of migraines or headaches. Reports +FM. Denies LOF, vaginal bleeding.     Pt offered appt at 3:30pm today for BP check. Pt declined and stated she is at work. Pt stated she has her next PN appt on Friday. Message to SYDNI (on call) for recs.

## 2025-02-25 NOTE — TELEPHONE ENCOUNTER
Take 2 tablet (650 mg) q 4-6 hrs.  Increase fluid intake.  Try caffeine.  Rest/sleep.  If no relief, needs to be seen for BP check.

## 2025-02-28 ENCOUNTER — ROUTINE PRENATAL (OUTPATIENT)
Dept: OBGYN CLINIC | Facility: CLINIC | Age: 25
End: 2025-02-28

## 2025-02-28 VITALS
SYSTOLIC BLOOD PRESSURE: 111 MMHG | HEART RATE: 86 BPM | DIASTOLIC BLOOD PRESSURE: 74 MMHG | BODY MASS INDEX: 38 KG/M2 | WEIGHT: 200.19 LBS

## 2025-02-28 DIAGNOSIS — Z34.83 ENCOUNTER FOR SUPERVISION OF OTHER NORMAL PREGNANCY IN THIRD TRIMESTER (HCC): Primary | ICD-10-CM

## 2025-02-28 LAB
APPEARANCE: CLEAR
BILIRUBIN: NEGATIVE
GLUCOSE (URINE DIPSTICK): NEGATIVE MG/DL
MULTISTIX LOT#: ABNORMAL NUMERIC
NITRITE, URINE: NEGATIVE
OCCULT BLOOD: NEGATIVE
PH, URINE: 6.5 (ref 4.5–8)
PROTEIN (URINE DIPSTICK): NEGATIVE MG/DL
SPECIFIC GRAVITY: 1.01 (ref 1–1.03)
URINE-COLOR: YELLOW
UROBILINOGEN,SEMI-QN: 0.2 MG/DL (ref 0–1.9)

## 2025-02-28 PROCEDURE — 81002 URINALYSIS NONAUTO W/O SCOPE: CPT | Performed by: OBSTETRICS & GYNECOLOGY

## 2025-02-28 PROCEDURE — 99213 OFFICE O/P EST LOW 20 MIN: CPT | Performed by: OBSTETRICS & GYNECOLOGY

## 2025-02-28 NOTE — PROGRESS NOTES
Doing well. Starting to have occasional contractions. RTC 1 week    See Prenatal Vitals flowsheet for documentation of vitals / urine / exam. See problem list -- updated in detail.   Used 20-29 min in order to do detailed chart review & review of blood work / ultrasound reports / MFM reports, reviewing history, performing prenatal exam / pelvic exam, counseling pt on vaccines, prenatal education, ordering tests or meds, ordering MFM /specialist referrals, documentation in EMR, interpretation / communication of results & care coordination

## 2025-03-04 ENCOUNTER — OFFICE VISIT (OUTPATIENT)
Age: 25
End: 2025-03-04
Attending: OBSTETRICS & GYNECOLOGY
Payer: MEDICAID

## 2025-03-04 VITALS
TEMPERATURE: 98 F | BODY MASS INDEX: 38 KG/M2 | DIASTOLIC BLOOD PRESSURE: 75 MMHG | WEIGHT: 203 LBS | HEART RATE: 88 BPM | OXYGEN SATURATION: 98 % | SYSTOLIC BLOOD PRESSURE: 111 MMHG | RESPIRATION RATE: 16 BRPM

## 2025-03-04 DIAGNOSIS — O99.012: Primary | ICD-10-CM

## 2025-03-04 NOTE — PROGRESS NOTES
Treasure to infusion center today for Venofer 200 mg dose 1 of 4. Pt arrives alert and ambulating independently, accompanied by her . As patient is 39 weeks pregnant, she is not sure if she will be able to receive all 4 doses prior to giving birth. Informed pt that per MD order, she can only receive 1 dose per week. Dose 2 scheduled for next Tuesday at 3pm. Pt is aware she can call to notify us if she will not be able to come to next appt.     Ordering Provider: Mayela Still MD  Order Exp: 3 doses remaining in order     Pt tolerated infusion without difficulty or complaint. Reviewed next apt date/time: 3/11 @ 3p      Education Record  Learner:  Patient and Spouse  Disease / Diagnosis: PETTY in pregnancy  Barriers / Limitations:  None  Method:  Reinforcement  General Topics:  Plan of care reviewed  Outcome:  Shows understanding

## 2025-03-06 ENCOUNTER — ROUTINE PRENATAL (OUTPATIENT)
Dept: OBGYN CLINIC | Facility: CLINIC | Age: 25
End: 2025-03-06

## 2025-03-06 VITALS
WEIGHT: 199.81 LBS | SYSTOLIC BLOOD PRESSURE: 106 MMHG | DIASTOLIC BLOOD PRESSURE: 72 MMHG | BODY MASS INDEX: 38 KG/M2 | HEART RATE: 90 BPM

## 2025-03-06 DIAGNOSIS — Z34.83 ENCOUNTER FOR SUPERVISION OF OTHER NORMAL PREGNANCY IN THIRD TRIMESTER (HCC): Primary | ICD-10-CM

## 2025-03-06 LAB
APPEARANCE: CLEAR
BILIRUBIN: NEGATIVE
GLUCOSE (URINE DIPSTICK): NEGATIVE MG/DL
KETONES (URINE DIPSTICK): NEGATIVE MG/DL
MULTISTIX LOT#: ABNORMAL NUMERIC
NITRITE, URINE: NEGATIVE
OCCULT BLOOD: NEGATIVE
PH, URINE: 7 (ref 4.5–8)
SPECIFIC GRAVITY: 1.01 (ref 1–1.03)
URINE-COLOR: YELLOW
UROBILINOGEN,SEMI-QN: 0.2 MG/DL (ref 0–1.9)

## 2025-03-06 PROCEDURE — 81002 URINALYSIS NONAUTO W/O SCOPE: CPT | Performed by: OBSTETRICS & GYNECOLOGY

## 2025-03-06 PROCEDURE — 99212 OFFICE O/P EST SF 10 MIN: CPT | Performed by: OBSTETRICS & GYNECOLOGY

## 2025-03-10 ENCOUNTER — HOSPITAL ENCOUNTER (INPATIENT)
Facility: HOSPITAL | Age: 25
LOS: 1 days | Discharge: HOME | End: 2025-03-11
Attending: OBSTETRICS & GYNECOLOGY | Admitting: OBSTETRICS & GYNECOLOGY
Payer: MEDICAID

## 2025-03-10 PROBLEM — Z34.90 PREGNANCY (HCC): Status: ACTIVE | Noted: 2025-03-10

## 2025-03-10 LAB
ANTIBODY SCREEN: NEGATIVE
BASOPHILS # BLD AUTO: 0.03 X10(3) UL (ref 0–0.2)
BASOPHILS NFR BLD AUTO: 0.4 %
DEPRECATED RDW RBC AUTO: 47 FL (ref 35.1–46.3)
EOSINOPHIL # BLD AUTO: 0.04 X10(3) UL (ref 0–0.7)
EOSINOPHIL NFR BLD AUTO: 0.5 %
ERYTHROCYTE [DISTWIDTH] IN BLOOD BY AUTOMATED COUNT: 22.9 % (ref 11–15)
HCT VFR BLD AUTO: 30.7 %
HGB BLD-MCNC: 8.5 G/DL
IMM GRANULOCYTES # BLD AUTO: 0.06 X10(3) UL (ref 0–1)
IMM GRANULOCYTES NFR BLD: 0.7 %
LYMPHOCYTES # BLD AUTO: 1.55 X10(3) UL (ref 1–4)
LYMPHOCYTES NFR BLD AUTO: 18.4 %
MCH RBC QN AUTO: 19.4 PG (ref 26–34)
MCHC RBC AUTO-ENTMCNC: 27.7 G/DL (ref 31–37)
MCV RBC AUTO: 70.1 FL
MONOCYTES # BLD AUTO: 0.32 X10(3) UL (ref 0.1–1)
MONOCYTES NFR BLD AUTO: 3.8 %
NEUTROPHILS # BLD AUTO: 6.43 X10 (3) UL (ref 1.5–7.7)
NEUTROPHILS # BLD AUTO: 6.43 X10(3) UL (ref 1.5–7.7)
NEUTROPHILS NFR BLD AUTO: 76.2 %
PLATELET # BLD AUTO: 356 10(3)UL (ref 150–450)
PLATELET MORPHOLOGY: NORMAL
RBC # BLD AUTO: 4.38 X10(6)UL
RH BLOOD TYPE: POSITIVE
T PALLIDUM AB SER QL IA: NONREACTIVE
WBC # BLD AUTO: 8.4 X10(3) UL (ref 4–11)

## 2025-03-10 PROCEDURE — 0KQM0ZZ REPAIR PERINEUM MUSCLE, OPEN APPROACH: ICD-10-PCS | Performed by: OBSTETRICS & GYNECOLOGY

## 2025-03-10 PROCEDURE — 59409 OBSTETRICAL CARE: CPT | Performed by: OBSTETRICS & GYNECOLOGY

## 2025-03-10 PROCEDURE — 0UQMXZZ REPAIR VULVA, EXTERNAL APPROACH: ICD-10-PCS | Performed by: OBSTETRICS & GYNECOLOGY

## 2025-03-10 RX ORDER — BISACODYL 10 MG
10 SUPPOSITORY, RECTAL RECTAL ONCE AS NEEDED
Status: DISCONTINUED | OUTPATIENT
Start: 2025-03-10 | End: 2025-03-11

## 2025-03-10 RX ORDER — SIMETHICONE 80 MG
80 TABLET,CHEWABLE ORAL 3 TIMES DAILY PRN
Status: DISCONTINUED | OUTPATIENT
Start: 2025-03-10 | End: 2025-03-11

## 2025-03-10 RX ORDER — DEXTROSE, SODIUM CHLORIDE, SODIUM LACTATE, POTASSIUM CHLORIDE, AND CALCIUM CHLORIDE 5; .6; .31; .03; .02 G/100ML; G/100ML; G/100ML; G/100ML; G/100ML
INJECTION, SOLUTION INTRAVENOUS CONTINUOUS
Status: DISCONTINUED | OUTPATIENT
Start: 2025-03-10 | End: 2025-03-10 | Stop reason: HOSPADM

## 2025-03-10 RX ORDER — TERBUTALINE SULFATE 1 MG/ML
0.25 INJECTION SUBCUTANEOUS AS NEEDED
Status: DISCONTINUED | OUTPATIENT
Start: 2025-03-10 | End: 2025-03-10 | Stop reason: HOSPADM

## 2025-03-10 RX ORDER — IBUPROFEN 600 MG/1
600 TABLET, FILM COATED ORAL ONCE AS NEEDED
Status: DISCONTINUED | OUTPATIENT
Start: 2025-03-10 | End: 2025-03-10 | Stop reason: HOSPADM

## 2025-03-10 RX ORDER — AMMONIA 15 % (W/V)
0.3 AMPUL (EA) INHALATION AS NEEDED
Status: DISCONTINUED | OUTPATIENT
Start: 2025-03-10 | End: 2025-03-11

## 2025-03-10 RX ORDER — LIDOCAINE HYDROCHLORIDE 10 MG/ML
INJECTION, SOLUTION EPIDURAL; INFILTRATION; INTRACAUDAL; PERINEURAL
Status: COMPLETED
Start: 2025-03-10 | End: 2025-03-10

## 2025-03-10 RX ORDER — SODIUM CHLORIDE, SODIUM LACTATE, POTASSIUM CHLORIDE, CALCIUM CHLORIDE 600; 310; 30; 20 MG/100ML; MG/100ML; MG/100ML; MG/100ML
INJECTION, SOLUTION INTRAVENOUS AS NEEDED
Status: DISCONTINUED | OUTPATIENT
Start: 2025-03-10 | End: 2025-03-10 | Stop reason: HOSPADM

## 2025-03-10 RX ORDER — ACETAMINOPHEN 500 MG
500 TABLET ORAL EVERY 6 HOURS PRN
Status: DISCONTINUED | OUTPATIENT
Start: 2025-03-10 | End: 2025-03-10 | Stop reason: HOSPADM

## 2025-03-10 RX ORDER — ONDANSETRON 2 MG/ML
4 INJECTION INTRAMUSCULAR; INTRAVENOUS EVERY 6 HOURS PRN
Status: DISCONTINUED | OUTPATIENT
Start: 2025-03-10 | End: 2025-03-10 | Stop reason: HOSPADM

## 2025-03-10 RX ORDER — IBUPROFEN 600 MG/1
600 TABLET, FILM COATED ORAL EVERY 6 HOURS
Status: DISCONTINUED | OUTPATIENT
Start: 2025-03-10 | End: 2025-03-11

## 2025-03-10 RX ORDER — ACETAMINOPHEN 500 MG
1000 TABLET ORAL EVERY 6 HOURS PRN
Status: DISCONTINUED | OUTPATIENT
Start: 2025-03-10 | End: 2025-03-11

## 2025-03-10 RX ORDER — CITRIC ACID/SODIUM CITRATE 334-500MG
30 SOLUTION, ORAL ORAL AS NEEDED
Status: DISCONTINUED | OUTPATIENT
Start: 2025-03-10 | End: 2025-03-10 | Stop reason: HOSPADM

## 2025-03-10 RX ORDER — DOCUSATE SODIUM 100 MG/1
100 CAPSULE, LIQUID FILLED ORAL
Status: DISCONTINUED | OUTPATIENT
Start: 2025-03-10 | End: 2025-03-11

## 2025-03-10 RX ORDER — ACETAMINOPHEN 500 MG
1000 TABLET ORAL EVERY 6 HOURS PRN
Status: DISCONTINUED | OUTPATIENT
Start: 2025-03-10 | End: 2025-03-10 | Stop reason: HOSPADM

## 2025-03-10 RX ORDER — ACETAMINOPHEN 500 MG
500 TABLET ORAL EVERY 6 HOURS PRN
Status: DISCONTINUED | OUTPATIENT
Start: 2025-03-10 | End: 2025-03-11

## 2025-03-10 RX ORDER — LIDOCAINE HYDROCHLORIDE 10 MG/ML
30 INJECTION, SOLUTION EPIDURAL; INFILTRATION; INTRACAUDAL; PERINEURAL ONCE
Status: COMPLETED | OUTPATIENT
Start: 2025-03-10 | End: 2025-03-10

## 2025-03-10 NOTE — L&D DELIVERY NOTE
[facility]  part of [facility]    Vaginal Delivery Note    Treasureana Acevedo Patient Status:  Inpatient    2000 MRN [id number]   Location [facility] Attending Minda La MD   Hosp Day # 0 PCP No primary care provider on file.       Delivery     Infant  Date of Delivery: 3/10/2025   Time of Delivery: 12:02 PM  Delivery Type: Normal spontaneous vaginal delivery    Infant Sex  Information for the patient's :  Rahul Acevedo [[id number]]   male    Infant Birthweight  Information for the patient's :  Rahul Acevedo [[id number]]   8 lb 0.8 oz (3.65 kg)     Presentation Vertex [1]  Position Right [3] Occiput [1] Anterior [1]    Apgars:  1 minute: 8               5 minutes: 9                        10 minutes:      Placenta  Date/Time of Delivery: 3/10/2025 12:07 PM   Delivery: spontaneous  Placenta to Pathology: no    Cord info:  Cord Gases Submitted: no  Cord Blood Collection: no  Cord Tissue Collection: no  Cord Complications: single nuchal      Episiotomy/Laceration Repair:  Small 2nd degree perineal laceration repaired with 3-0 Vicryl  Right periurethral laceration repaired with 4-0 Vicryl    Maternal Anesthesia: local     Delivery Complications  none    Neonatologist Present: no    QBL:  196cc    Sponge and Needle Counts:  Verified    Delivery Comment: Pushed a couple of times.  Suctioned at perineum.  Nuchal cord x 1, tight, cut prior to delivery of shoulders.          Minda La MD   3/10/2025  12:23 PM

## 2025-03-10 NOTE — H&P
[facility]  part of [facility]    History & Physical    Treasure Acevedo Patient Status:  Inpatient    2000 MRN [id number]   Location [facility] Attending Minda La MD   Hosp Day # 0 PCP No primary care provider on file.     Date of Admission:  3/10/2025    SUBJECTIVE:    Treasure Acevedo is a 25 year old , with EDC 3/10/2025, by Ultrasound giving EGA 40w0d, who is being admitted for labor management.   Presents with labor.      Blood:  O+    Rubella immune  Hep B negative.   GBS neg      Problem List:   Patient Active Problem List    Diagnosis    Pregnancy (HCC)    Anemia complicating pregnancy, second trimester (HCC)     24  Begin Slow Fe daily and plan CBC / ferritin 1 month after starting FE      Recent Labs     24  0947 10/09/24  1240 24  0953 25  1548   RBC 4.85 4.82 4.34 4.24   HGB 11.1* 10.9* 9.5* 8.3*   HCT 35.0 35.4 31.0* 29.1*   MCV 72.2* 73.4* 71.4* 68.6*   MCH 22.9* 22.6* 21.9* 19.6*   MCHC 31.7 30.8* 30.6* 28.5*   RDW 18.3* 17.5* 17.0* 18.5*   NEPRELIM 5.20  --   --   --    WBC 7.4 8.7 9.5 7.3   .0 397.0 395.0 364.0           Lab Results   Component Value Date    AMIRA 6 (L) 10/09/2024    AMIRA 5.5 (L) 2024    AMIRA 9.4 (L) 10/25/2023       If Hb less than 11, start iron & then recheck CBC/ferritin in one month            No blood products     10/9/2024: pt accepts blood in emergency    Patient refuse blood products      ASCUS with positive high risk HPV cervical     23  Repeat 1 year      BMI 36     25 EFW 59%  23 fetal echo negative  Normal level 2; has fetal echo at 24 wks due to paternal famHx      If BMI over 35, [  ] level 2 u/s & then follow [facility] recs                 Family history of congenital heart disease     24  Normal fetal echo    Pt states FOB side had heart attack at age 14--no cardiac defect   23: Normal anatomy scan at 26w5d (did not show for 20w or 24w scan)    Level 2 US  with MFM and fetal echo at 24-26 weeks       Obstetric History:   OB History    Para Term  AB Living   2 1 1     1   SAB IAB Ectopic Multiple Live Births         0 1      # Outcome Date GA Lbr Aquiles/2nd Weight Sex Type Anes PTL Lv   2 Current            1 Term 25 39w2d 14:36 / 00:18 7 lb 11.5 oz (3.5 kg) M NORMAL SPONT Pud N TONE      Complications: Variable decelerations     Past Medical History:   Past Medical History:    Anemia    History of HPV infection    Obesity (BMI 30.0-34.9)    Tattoos     Past Social History: History reviewed. No pertinent surgical history.  Family History:   Family History   Problem Relation Age of Onset    Lipids Mother     Hypertension Mother     Cancer Neg     Diabetes Neg     Heart Disorder Neg      Social History:   Social History     Tobacco Use    Smoking status: Never    Smokeless tobacco: Never   Substance Use Topics    Alcohol use: Not Currently     Comment: rarely prior to preg       Home Meds: Prescriptions Prior to Admission[1]    Allergies: Allergies[2]      OBJECTIVE:    Temp:  [98.7 °F (37.1 °C)] 98.7 °F (37.1 °C)  Pulse:  [68] 68  BP: (136)/(87) 136/87      General: Alert and Oriented  Abdomen: Soft, Gravid    Cervix  8/C/-1  AROM--clear    Ctx q 2-3 min  Fetal heart tones:  Baseline 130   Fetal heart variability: moderate and reactive  Decelerations: No      Lab Review:  Results for orders placed or performed during the hospital encounter of 03/10/25   CBC With Differential With Platelet    Collection Time: 03/10/25 10:01 AM   Result Value Ref Range    WBC 8.4 4.0 - 11.0 x10(3) uL    RBC 4.38 3.80 - 5.30 x10(6)uL    HGB 8.5 (L) 12.0 - 16.0 g/dL    HCT 30.7 (L) 35.0 - 48.0 %    MCV 70.1 (L) 80.0 - 100.0 fL    MCH 19.4 (L) 26.0 - 34.0 pg    MCHC 27.7 (L) 31.0 - 37.0 g/dL    RDW-SD 47.0 (H) 35.1 - 46.3 fL    RDW 22.9 (H) 11.0 - 15.0 %    .0 150.0 - 450.0 10(3)uL    Neutrophil Absolute Prelim 6.43 1.50 - 7.70 x10 (3) uL    Neutrophil Absolute 6.43  1.50 - 7.70 x10(3) uL    Lymphocyte Absolute 1.55 1.00 - 4.00 x10(3) uL    Monocyte Absolute 0.32 0.10 - 1.00 x10(3) uL    Eosinophil Absolute 0.04 0.00 - 0.70 x10(3) uL    Basophil Absolute 0.03 0.00 - 0.20 x10(3) uL    Immature Granulocyte Absolute 0.06 0.00 - 1.00 x10(3) uL    Neutrophil % 76.2 %    Lymphocyte % 18.4 %    Monocyte % 3.8 %    Eosinophil % 0.5 %    Basophil % 0.4 %    Immature Granulocyte % 0.7 %   RBC Morphology Scan    Collection Time: 03/10/25 10:01 AM   Result Value Ref Range    RBC Morphology See morphology below (A) Normal, Slide reviewed, see previous RBC morphology.    Platelet Morphology Normal Normal    Microcytosis 1+     ABORH (Blood Type)    Collection Time: 03/10/25 10:01 AM   Result Value Ref Range    ABO BLOOD TYPE O     RH BLOOD TYPE Positive    Antibody Screen    Collection Time: 03/10/25 10:01 AM   Result Value Ref Range    Antibody Screen Negative           ASSESSMENT:    1.  Labor      PLAN:    Anticipate NVD  GBS neg  FHTs reassuring         Minda La MD  3/10/2025  11:08 AM        [1]   Medications Prior to Admission   Medication Sig Dispense Refill Last Dose/Taking    prenatal vitamin with DHA 27-0.8-228 MG Oral Cap Take 1 capsule by mouth daily.   3/10/2025 Morning    ferrous sulfate 325 (65 FE) MG Oral Tab EC Take 1 tablet (325 mg total) by mouth daily with breakfast.   3/10/2025 Morning    Pyridoxine HCl (VITAMIN B-6) 25 MG Oral Tab Take 1 tablet (25 mg total) by mouth in the morning, at noon, and at bedtime. 90 tablet 3 3/10/2025 Morning    Doxylamine Succinate, Sleep, (UNISOM SLEEPTABS) 25 MG Oral Tab Take 12.5 mg by mouth nightly as needed for Sleep. 30 tablet 1     triamcinolone 0.1 % External Cream Apply topically 2 (two) times daily as needed. 453.6 g 0    [2] No Known Allergies

## 2025-03-10 NOTE — DISCHARGE SUMMARY
[facility]  part of [facility]    Discharge Summary    Treasure Acevedo Patient Status:  Inpatient    2000 MRN [id number]   Location [facility] Attending Minda La MD   Hosp Day # 1       Admission Date:  3/10/2025    Discharge Date: 25    Delivering OB Clinician:  Dr La    EDC: Estimated Date of Delivery: 3/10/25    Gestational Age: 40w0d    Antepartum complications:   Patient Active Problem List   Diagnosis    BMI 36    Family history of congenital heart disease    ASCUS with positive high risk HPV cervical    Normal labor (HCC)    No blood products    Anemia complicating pregnancy, second trimester (HCC)    Pregnancy (HCC)       Date of Delivery: 3/10/2025 Time of Delivery: 12:02 PM    Delivery Type: spontaneous vaginal delivery    Baby: Liveborn male   Information for the patient's :  Rahul Acevedo [[id number]]   8 lb 0.8 oz (3.65 kg)  Apgars:  1 minute: 8  5 minutes: 910 minutes:        Hospital Course: Presents with labor.  Small 2nd degree perineal and right periurethral laceration.    No complications. Routine delivery and postpartum care    Discharge Physical Exam:   /73 (BP Location: Right arm)   Pulse 65   Temp 98.3 °F (36.8 °C) (Oral)   Resp 15   Wt 199 lb (90.3 kg)   LMP 06/10/2024 (Exact Date)   Breastfeeding Yes   BMI 37.60 kg/m²   General appearance:  alert, appears stated age, cooperative and no distress  Abdominal: soft, non-tender, no rebound  Uterus: firm, nontender, below umbilicus  Pelvic: deferred  Extremities: Homans sign is negative, no sign of DVT    Discharged Condition: stable    Disposition: home with self care    Plan:     Follow-up appointment in 6 weeks with Dr. La

## 2025-03-10 NOTE — PROGRESS NOTES
Pt is a 25 year old female admitted to LDR6/LDR6-A.     Chief Complaint   Patient presents with    R/o Labor     Uc's since yesterday, coming stronger over night.      Pt is  40w0d intra-uterine pregnancy.  History obtained, consents signed. Oriented to room, staff, and plan of care.

## 2025-03-10 NOTE — PLAN OF CARE
Sat with patient to review plan of care. Discussed analgesic options and answered all questions. VSS. Breastfeeding on demand. Breastfeeding successfully. Lochia is WNL. Uterus is firm.      Problem: PAIN - ADULT  Goal: Verbalizes/displays adequate comfort level or patient's stated pain goal  Description: INTERVENTIONS:  - Encourage pt to monitor pain and request assistance  - Assess pain using appropriate pain scale  - Administer analgesics based on type and severity of pain and evaluate response  - Implement non-pharmacological measures as appropriate and evaluate response  - Consider cultural and social influences on pain and pain management  - Manage/alleviate anxiety  - Utilize distraction and/or relaxation techniques  - Monitor for opioid side effects  - Notify MD/LIP if interventions unsuccessful or patient reports new pain  - Anticipate increased pain with activity and pre-medicate as appropriate  Outcome: Progressing     Problem: ANXIETY  Goal: Will report anxiety at manageable levels  Description: INTERVENTIONS:  - Administer medication as ordered  - Teach and rehearse alternative coping skills  - Provide emotional support with 1:1 interaction with staff  Outcome: Progressing     Problem: POSTPARTUM  Goal: Long Term Goal:Experiences normal postpartum course  Description: INTERVENTIONS:  - Assess and monitor vital signs and lab values.  - Assess fundus and lochia.  - Provide ice/sitz baths for perineum discomfort.  - Monitor healing of incision/episiotomy/laceration, and assess for signs and symptoms of infection and hematoma.  - Assess bladder function and monitor for bladder distention.  - Provide/instruct/assist with pericare as needed.  - Provide VTE prophylaxis as needed.  - Monitor bowel function.  - Encourage ambulation and provide assistance as needed.  - Assess and monitor emotional status and provide social service/psych resources as needed.  - Utilize standard precautions and use personal  protective equipment as indicated. Ensure aseptic care of all intravenous lines and invasive tubes/drains.  - Obtain immunization and exposure to communicable diseases history.  Outcome: Progressing  Goal: Optimize infant feeding at the breast  Description: INTERVENTIONS:  - Initiate breast feeding within first hour after birth.   - Monitor effectiveness of current breast feeding efforts.  - Assess support systems available to mother/family.  - Identify cultural beliefs/practices regarding lactation, letdown techniques, maternal food preferences.  - Assess mother's knowledge and previous experience with breast feeding.  - Provide information as needed about early infant feeding cues (e.g., rooting, lip smacking, sucking fingers/hand) versus late cue of crying.  - Discuss/demonstrate breast feeding aids (e.g., infant sling, nursing footstool/pillows, and breast pumps).  - Encourage mother/other family members to express feelings/concerns, and actively listen.  - Educate father/SO about benefits of breast feeding and how to manage common lactation challenges.  - Recommend avoidance of specific medications or substances incompatible with breast feeding.  - Assess and monitor for signs of nipple pain/trauma.  - Instruct and provide assistance with proper latch.  - Review techniques for milk expression (breast pumping) and storage of breast milk. Provide pumping equipment/supplies, instructions and assistance, as needed.  - Encourage rooming-in and breast feeding on demand.  - Encourage skin-to-skin contact.  - Provide LC support as needed.  - Assess for and manage engorgement.  - Provide breast feeding education handouts and information on community breast feeding support.   Outcome: Progressing  Goal: Establishment of adequate milk supply with medication/procedure interruptions  Description: INTERVENTIONS:  - Review techniques for milk expression (breast pumping).   - Provide pumping equipment/supplies, instructions,  and assistance until it is safe to breastfeed infant.  Outcome: Progressing  Goal: Experiences normal breast weaning course  Description: INTERVENTIONS:  - Assess for and manage engorgement.  - Instruct on breast care.  - Provide comfort measures.  Outcome: Progressing  Goal: Appropriate maternal -  bonding  Description: INTERVENTIONS:  - Assess caregiver- interactions.  - Assess caregiver's emotional status and coping mechanisms.  - Encourage caregiver to participate in  daily care.  - Assess support systems available to mother/family.  - Provide /case management support as needed.  Outcome: Progressing

## 2025-03-11 VITALS
TEMPERATURE: 98 F | SYSTOLIC BLOOD PRESSURE: 114 MMHG | WEIGHT: 199 LBS | RESPIRATION RATE: 15 BRPM | DIASTOLIC BLOOD PRESSURE: 73 MMHG | BODY MASS INDEX: 38 KG/M2 | HEART RATE: 65 BPM

## 2025-03-11 LAB
BASOPHILS # BLD AUTO: 0.05 X10(3) UL (ref 0–0.2)
BASOPHILS NFR BLD AUTO: 0.5 %
DEPRECATED RDW RBC AUTO: 47.2 FL (ref 35.1–46.3)
EOSINOPHIL # BLD AUTO: 0.05 X10(3) UL (ref 0–0.7)
EOSINOPHIL NFR BLD AUTO: 0.5 %
ERYTHROCYTE [DISTWIDTH] IN BLOOD BY AUTOMATED COUNT: 23 % (ref 11–15)
HCT VFR BLD AUTO: 29 %
HGB BLD-MCNC: 8.1 G/DL
IMM GRANULOCYTES # BLD AUTO: 0.05 X10(3) UL (ref 0–1)
IMM GRANULOCYTES NFR BLD: 0.5 %
LYMPHOCYTES # BLD AUTO: 2.94 X10(3) UL (ref 1–4)
LYMPHOCYTES NFR BLD AUTO: 26.6 %
MCH RBC QN AUTO: 19.9 PG (ref 26–34)
MCHC RBC AUTO-ENTMCNC: 27.9 G/DL (ref 31–37)
MCV RBC AUTO: 71.1 FL
MONOCYTES # BLD AUTO: 0.38 X10(3) UL (ref 0.1–1)
MONOCYTES NFR BLD AUTO: 3.4 %
NEUTROPHILS # BLD AUTO: 7.58 X10 (3) UL (ref 1.5–7.7)
NEUTROPHILS # BLD AUTO: 7.58 X10(3) UL (ref 1.5–7.7)
NEUTROPHILS NFR BLD AUTO: 68.5 %
PLATELET # BLD AUTO: 306 10(3)UL (ref 150–450)
RBC # BLD AUTO: 4.08 X10(6)UL
WBC # BLD AUTO: 11.1 X10(3) UL (ref 4–11)

## 2025-03-11 RX ORDER — IBUPROFEN 600 MG/1
600 TABLET, FILM COATED ORAL EVERY 6 HOURS PRN
Qty: 20 TABLET | Refills: 0 | Status: SHIPPED | OUTPATIENT
Start: 2025-03-11

## 2025-03-11 NOTE — CM/SW NOTE
SW received MDO for [facility]  SW met with patient and FOB at bedside.  SW confirmed face sheet contact as correct.    Baby boy name: Art   Date & time of delivery: 3/10/2025 12:02 PM   Delivery method: Vaginal   Siblings age: Brother 17 months    Patient employed: No  Length of maternity leave: NA    Father of baby employed: Yes  Length of paternity leave: 1 week    Breast or formula feed: Both    Pediatrician: Vince Foy DO    Infant Insurance: [employer] Medicaid    Mental Health History: Hx Post Partum Depression    Medications: Geeta    Therapist: Geeta    Psychiatrist: Geeta QUEZADA discussed signs, symptoms and risks associated with post partum depression & anxiety.  SW provided pt with PMAD resources.  Other resources provided: [facility] Medicaid and Transport     Patient support system: FOB and Family     Patient denied current questions/needs from [facility]IL[facility].    SW to remain available for support and/or discharge planning.    Alejandra Chanel MSW, LSW   [id number]

## 2025-03-24 ENCOUNTER — TELEPHONE (OUTPATIENT)
Dept: OBGYN UNIT | Facility: HOSPITAL | Age: 25
End: 2025-03-24

## 2025-03-25 ENCOUNTER — TELEPHONE (OUTPATIENT)
Dept: OBGYN UNIT | Facility: HOSPITAL | Age: 25
End: 2025-03-25

## 2025-03-29 ENCOUNTER — TELEPHONE (OUTPATIENT)
Dept: OBGYN UNIT | Facility: HOSPITAL | Age: 25
End: 2025-03-29

## 2025-04-22 ENCOUNTER — TELEPHONE (OUTPATIENT)
Dept: OBGYN CLINIC | Facility: CLINIC | Age: 25
End: 2025-04-22

## 2025-04-22 RX ORDER — LEVONORGESTREL 1.5 MG/1
1.5 TABLET ORAL ONCE
Qty: 1 TABLET | Refills: 0 | Status: SHIPPED | OUTPATIENT
Start: 2025-04-22 | End: 2025-04-22

## 2025-04-22 NOTE — TELEPHONE ENCOUNTER
Spoke with SYDNI who approved prescription for plan B. RX sent to pharmacy and pt informed. Pt again reminded to abstain from IC until cleared at 6 week PP visit.

## 2025-04-22 NOTE — TELEPHONE ENCOUNTER
Pt is calling to see if the doctor would prescribe the Plan B pill. Pt stated she had unprotected IC at 1am this morning. Pt delivered  with JLK on 3/10. Pt informed she should nto be haing IC until she is cleared at her 6 week postpartum appt (which is scheduled for ). Pt verbalized understanding.      Message to DEE DEEK if OK to send Plan B to pharmacy?

## 2025-05-01 ENCOUNTER — POSTPARTUM (OUTPATIENT)
Dept: OBGYN CLINIC | Facility: CLINIC | Age: 25
End: 2025-05-01

## 2025-05-01 VITALS
WEIGHT: 184 LBS | HEART RATE: 89 BPM | SYSTOLIC BLOOD PRESSURE: 103 MMHG | DIASTOLIC BLOOD PRESSURE: 70 MMHG | BODY MASS INDEX: 35 KG/M2

## 2025-05-01 NOTE — PROGRESS NOTES
HPI    Treasure Acevedo is a 25 year old female  here for 6 week post-partum visit.  Patient delivered a  male infant on 3/10/2025 by .      Patient is breast & bottle feeding.   Patient denies symptoms of depression, [place]  depression scale score of 8 out of 30.    Last pap 2024.  LMP 25      OBSTETRIC HISTORY  OB History    Para Term  AB Living   2 2 2   2   SAB IAB Ectopic Multiple Live Births      0 2      # Outcome Date GA Lbr Aquiles/2nd Weight Sex Type Anes PTL Lv   2 Term 03/10/25 40w0d 02:59 / 00:03 8 lb 0.8 oz (3.65 kg) M NORMAL SPONT Local N TONE   1 Term 23 39w2d 14:36 / 00:18 7 lb 11.5 oz (3.5 kg) M NORMAL SPONT Pud N TONE      Complications: Variable decelerations       MEDICATIONS:    Current Outpatient Medications:     prenatal vitamin with DHA 27-0.8-228 MG Oral Cap, Take 1 capsule by mouth daily., Disp: , Rfl:     ferrous sulfate 325 (65 FE) MG Oral Tab EC, Take 1 tablet (325 mg total) by mouth daily with breakfast., Disp: , Rfl:     ALLERGIES:  No Known Allergies    PHYSICAL EXAM  Blood pressure 103/70, pulse 89, weight 184 lb (83.5 kg), last menstrual period 2025, currently breastfeeding.  General:  Well nourished, well developed woman in no acute distress  Abdomen:  soft, nontender, no masses    External Genitalia: normal appearance, hair distribution, and no lesions  Vagina:  Normal appearance without lesions, no abnormal discharge  Cervix:  Normal without tenderness on motion  Uterus: normal in size, contour, position, mobility, without tenderness  Adnexa: normal without masses or tenderness  Perineum: well healed perineum      ASSESSMENT/PLAN  Normal Post partum exam  Discussed various contraception.  Information on Mirena IUD.  Will call if interested.  Will need serum pregnancy test.  RTC annual

## (undated) NOTE — LETTER
[facility]  Administration of Anesthesia  ITreasure agree to be cared for by a physician anesthesiologist alone and/or with a nurse anesthetist, who is specially trained to monitor me and give me medicine to put me to sleep or keep me comfortable during my procedure    I understand that my anesthesiologist and/or anesthetist is not an employee or agent of [facility] or [employer]. He or she works for [facility]    As the patient asking for anesthesia services, I agree to:  Allow the anesthesiologist (anesthesia doctor) to give me medicine and do additional procedures as necessary. Some examples are: Starting or using an “IV” to give me medicine, fluids or blood during my procedure, and having a breathing tube placed to help me breathe when I’m asleep (intubation). In the event that my heart stops working properly, I understand that my anesthesiologist will make every effort to sustain my life, unless otherwise directed by [facility] Do Not Resuscitate documents.  Tell my anesthesia doctor before my procedure:  If I am pregnant.  The last time that I ate or drank.  iii. All of the medicines I take (including prescriptions, herbal supplements, and pills I can buy without a prescription (including street drugs/illegal medications). Failure to inform my anesthesiologist about these medicines may increase my risk of anesthetic complications.  iv.If I am allergic to anything or have had a reaction to anesthesia before.  I understand how the anesthesia medicine will help me (benefits).  I understand that with any type of anesthesia medicine there are risks:  The most common risks are: nausea, vomiting, sore throat, muscle soreness, damage to my eyes, mouth, or teeth (from breathing tube placement).  Rare risks include: remembering what happened during my procedure, allergic reactions to medications, injury to my airway, heart, lungs, vision, nerves, or muscles  and in extremely rare instances death.  My doctor has explained to me other choices available to me for my care (alternatives).  Pregnant Patients (“epidural”):  I understand that the risks of having an epidural (medicine given into my back to help control pain during labor), include itching, low blood pressure, difficulty urinating, headache or slowing of the baby’s heart. Very rare risks include infection, bleeding, seizure, irregular heart rhythms and nerve injury.  Regional Anesthesia (“spinal”, “epidural”, & “nerve blocks”):  I understand that rare but potential complications include headache, bleeding, infection, seizure, irregular heart rhythms, and nerve injury.    _____________________________________________________________________________  Patient (or Representative) Signature/Relationship to Patient  Date   Time    _____________________________________________________________________________   Name (if used)    Language/Organization   Time    _____________________________________________________________________________  Nurse Anesthetist Signature     Date   Time  _____________________________________________________________________________  Anesthesiologist Signature     Date   Time  I have discussed the procedure and information above with the patient (or patient’s representative) and answered their questions. The patient or their representative has agreed to have anesthesia services.    _____________________________________________________________________________  Witness        Date   Time  I have verified that the signature is that of the patient or patient’s representative, and that it was signed before the procedure  Patient Name: Treasure Acevedo     : 2000                 Printed: 3/10/2025 at 9:53 AM    Medical Record #: [id number]                                            Page 1 of 1  ----------ANESTHESIA CONSENT----------

## (undated) NOTE — LETTER
VACCINE ADMINISTRATION RECORD  PARENT / GUARDIAN APPROVAL  Date: 2025  Vaccine administered to: Treasure Acevedo     : 2000    MRN: [id number]    A copy of the appropriate Centers for Disease Control and Prevention Vaccine Information statement has been provided. I have read or have had explained the information about the diseases and the vaccines listed below. There was an opportunity to ask questions and any questions were answered satisfactorily. I believe that I understand the benefits and risks of the vaccine cited and ask that the vaccine(s) listed below be given to me or to the person named above (for whom I am authorized to make this request).    VACCINES ADMINISTERED:  Tdap    I have read and hereby agree to be bound by the terms of this agreement as stated above. My signature is valid until revoked by me in writing.  This document is signed by Treasure Acevedo, relationship: Self on 2025.:                                                                                                                                         Parent / Guardian Signature                                                Date    Rosanne CERVANTES RN served as a witness to authentication that the identity of the person signing electronically is in fact the person represented as signing.    This document was generated by Rosanne CERVANTES RN on 2025.

## (undated) NOTE — LETTER
VACCINE ADMINISTRATION RECORD  PARENT / GUARDIAN APPROVAL  Date: 8/15/2023  Vaccine administered to: Nevaeh Benitez     : 2000    MRN: [id number]    A copy of the appropriate Centers for Disease Control and Prevention Vaccine Information statement has been provided. I have read or have had explained the information about the diseases and the vaccines listed below. There was an opportunity to ask questions and any questions were answered satisfactorily. I believe that I understand the benefits and risks of the vaccine cited and ask that the vaccine(s) listed below be given to me or to the person named above (for whom I am authorized to make this request). VACCINES ADMINISTERED:  Tdap    I have read and hereby agree to be bound by the terms of this agreement as stated above. My signature is valid until revoked by me in writing. This document is signed by SELF, relationship: Self on 8/15/2023.:                                                                                                                                         Parent / Guardian Signature                                                Date    Fabian Perry served as a witness to authentication that the identity of the person signing electronically is in fact the person represented as signing.